# Patient Record
Sex: MALE | Race: WHITE | Employment: FULL TIME | URBAN - METROPOLITAN AREA
[De-identification: names, ages, dates, MRNs, and addresses within clinical notes are randomized per-mention and may not be internally consistent; named-entity substitution may affect disease eponyms.]

---

## 2017-12-21 ENCOUNTER — HOSPITAL ENCOUNTER (OUTPATIENT)
Dept: MRI IMAGING | Age: 42
Discharge: HOME | End: 2017-12-21
Payer: COMMERCIAL

## 2017-12-21 DIAGNOSIS — M23.302 INTERNAL DERANGEMENT OF KNEE INVOLVING LATERAL MENISCUS: ICD-10-CM

## 2017-12-21 PROCEDURE — 73721 MRI JNT OF LWR EXTRE W/O DYE: CPT

## 2019-12-23 LAB — SURGICAL PATHOLOGY REPORT: NORMAL

## 2023-03-20 PROBLEM — G40.909 SEIZURE DISORDER (MULTI): Status: ACTIVE | Noted: 2023-03-20

## 2023-03-20 PROBLEM — R00.2 PALPITATIONS: Status: ACTIVE | Noted: 2023-03-20

## 2023-03-20 PROBLEM — R06.09 EXERTIONAL DYSPNEA: Status: ACTIVE | Noted: 2023-03-20

## 2023-03-20 PROBLEM — H69.91 DYSFUNCTION OF RIGHT EUSTACHIAN TUBE: Status: ACTIVE | Noted: 2023-03-20

## 2023-03-20 PROBLEM — R07.9 CHEST PAIN: Status: ACTIVE | Noted: 2023-03-20

## 2023-03-20 PROBLEM — M25.519 SHOULDER PAIN: Status: ACTIVE | Noted: 2023-03-20

## 2023-03-20 PROBLEM — R07.89 CHEST TIGHTNESS: Status: ACTIVE | Noted: 2023-03-20

## 2023-03-20 PROBLEM — H91.21 SUDDEN HEARING LOSS, RIGHT: Status: ACTIVE | Noted: 2023-03-20

## 2023-03-20 PROBLEM — E29.1 MALE HYPOGONADISM: Status: ACTIVE | Noted: 2023-03-20

## 2023-03-20 PROBLEM — E78.5 HYPERLIPEMIA: Status: ACTIVE | Noted: 2023-03-20

## 2023-03-20 PROBLEM — M25.512 ARTHRALGIA OF LEFT ACROMIOCLAVICULAR JOINT: Status: ACTIVE | Noted: 2023-03-20

## 2023-03-20 PROBLEM — H60.60 CHRONIC OTITIS EXTERNA: Status: ACTIVE | Noted: 2023-03-20

## 2023-03-20 PROBLEM — H90.3 ASYMMETRIC SNHL (SENSORINEURAL HEARING LOSS): Status: ACTIVE | Noted: 2023-03-20

## 2023-03-20 PROBLEM — J30.2 SEASONAL ALLERGIC RHINITIS: Status: ACTIVE | Noted: 2023-03-20

## 2023-03-20 RX ORDER — TESTOSTERONE 50 MG/5G
1 GEL TRANSDERMAL DAILY
COMMUNITY
Start: 2020-03-27 | End: 2024-04-22 | Stop reason: WASHOUT

## 2023-03-20 RX ORDER — PHENYTOIN SODIUM 100 MG/1
400 CAPSULE, EXTENDED RELEASE ORAL NIGHTLY
COMMUNITY
Start: 2022-09-27 | End: 2023-05-22

## 2023-03-20 RX ORDER — MOMETASONE FUROATE 1 MG/G
CREAM TOPICAL 2 TIMES DAILY
COMMUNITY

## 2023-03-20 RX ORDER — ATORVASTATIN CALCIUM 40 MG/1
40 TABLET, FILM COATED ORAL DAILY
COMMUNITY
End: 2023-07-19 | Stop reason: ALTCHOICE

## 2023-03-20 RX ORDER — HYDROCHLOROTHIAZIDE 25 MG/1
1 TABLET ORAL DAILY
COMMUNITY
Start: 2022-01-07 | End: 2023-05-04 | Stop reason: ALTCHOICE

## 2023-03-23 ENCOUNTER — OFFICE VISIT (OUTPATIENT)
Dept: PRIMARY CARE | Facility: CLINIC | Age: 48
End: 2023-03-23
Payer: COMMERCIAL

## 2023-03-23 ENCOUNTER — LAB (OUTPATIENT)
Dept: LAB | Facility: LAB | Age: 48
End: 2023-03-23
Payer: COMMERCIAL

## 2023-03-23 VITALS
DIASTOLIC BLOOD PRESSURE: 82 MMHG | TEMPERATURE: 97.3 F | BODY MASS INDEX: 37.88 KG/M2 | WEIGHT: 264 LBS | SYSTOLIC BLOOD PRESSURE: 116 MMHG

## 2023-03-23 DIAGNOSIS — Z83.3 FAMILY HISTORY OF DIABETES MELLITUS: ICD-10-CM

## 2023-03-23 DIAGNOSIS — E78.49 OTHER HYPERLIPIDEMIA: ICD-10-CM

## 2023-03-23 DIAGNOSIS — G40.909 SEIZURE DISORDER (MULTI): ICD-10-CM

## 2023-03-23 LAB
ALANINE AMINOTRANSFERASE (SGPT) (U/L) IN SER/PLAS: 54 U/L (ref 10–52)
ALBUMIN (G/DL) IN SER/PLAS: 4.8 G/DL (ref 3.4–5)
ALKALINE PHOSPHATASE (U/L) IN SER/PLAS: 91 U/L (ref 33–120)
ANION GAP IN SER/PLAS: 11 MMOL/L (ref 10–20)
ASPARTATE AMINOTRANSFERASE (SGOT) (U/L) IN SER/PLAS: 31 U/L (ref 9–39)
BILIRUBIN TOTAL (MG/DL) IN SER/PLAS: 0.6 MG/DL (ref 0–1.2)
CALCIUM (MG/DL) IN SER/PLAS: 9.8 MG/DL (ref 8.6–10.3)
CARBON DIOXIDE, TOTAL (MMOL/L) IN SER/PLAS: 31 MMOL/L (ref 21–32)
CHLORIDE (MMOL/L) IN SER/PLAS: 102 MMOL/L (ref 98–107)
CHOLESTEROL (MG/DL) IN SER/PLAS: 113 MG/DL (ref 0–199)
CHOLESTEROL IN HDL (MG/DL) IN SER/PLAS: 34.9 MG/DL
CHOLESTEROL/HDL RATIO: 3.2
CREATININE (MG/DL) IN SER/PLAS: 1.15 MG/DL (ref 0.5–1.3)
ESTIMATED AVERAGE GLUCOSE FOR HBA1C: 108 MG/DL
GFR MALE: 79 ML/MIN/1.73M2
GLUCOSE (MG/DL) IN SER/PLAS: 89 MG/DL (ref 74–99)
HEMOGLOBIN A1C/HEMOGLOBIN TOTAL IN BLOOD: 5.4 %
LDL: 36 MG/DL (ref 0–99)
NON HDL CHOLESTEROL: 78 MG/DL
PHENYTOIN (UG/ML) IN SER/PLAS: 3 UG/ML (ref 10–20)
POTASSIUM (MMOL/L) IN SER/PLAS: 4.4 MMOL/L (ref 3.5–5.3)
PROTEIN TOTAL: 7.2 G/DL (ref 6.4–8.2)
SODIUM (MMOL/L) IN SER/PLAS: 140 MMOL/L (ref 136–145)
TRIGLYCERIDE (MG/DL) IN SER/PLAS: 210 MG/DL (ref 0–149)
UREA NITROGEN (MG/DL) IN SER/PLAS: 14 MG/DL (ref 6–23)
VLDL: 42 MG/DL (ref 0–40)

## 2023-03-23 PROCEDURE — 99214 OFFICE O/P EST MOD 30 MIN: CPT | Performed by: FAMILY MEDICINE

## 2023-03-23 PROCEDURE — 36415 COLL VENOUS BLD VENIPUNCTURE: CPT

## 2023-03-23 PROCEDURE — 80061 LIPID PANEL: CPT

## 2023-03-23 PROCEDURE — 80185 ASSAY OF PHENYTOIN TOTAL: CPT

## 2023-03-23 PROCEDURE — 80053 COMPREHEN METABOLIC PANEL: CPT

## 2023-03-23 PROCEDURE — 83036 HEMOGLOBIN GLYCOSYLATED A1C: CPT

## 2023-03-23 PROCEDURE — 3008F BODY MASS INDEX DOCD: CPT | Performed by: FAMILY MEDICINE

## 2023-03-23 RX ORDER — SEMAGLUTIDE 1.34 MG/ML
INJECTION, SOLUTION SUBCUTANEOUS
COMMUNITY
Start: 2023-02-21 | End: 2023-03-23 | Stop reason: ALTCHOICE

## 2023-03-23 RX ORDER — SEMAGLUTIDE 1.34 MG/ML
1 INJECTION, SOLUTION SUBCUTANEOUS
Qty: 3 ML | Refills: 0 | Status: SHIPPED | OUTPATIENT
Start: 2023-03-23 | End: 2023-04-19 | Stop reason: SDUPTHER

## 2023-03-23 ASSESSMENT — ENCOUNTER SYMPTOMS
CARDIOVASCULAR NEGATIVE: 1
RESPIRATORY NEGATIVE: 1
DEPRESSION: 0
CONSTITUTIONAL NEGATIVE: 1
OCCASIONAL FEELINGS OF UNSTEADINESS: 0
LOSS OF SENSATION IN FEET: 0
ENDOCRINE NEGATIVE: 1

## 2023-03-23 ASSESSMENT — PATIENT HEALTH QUESTIONNAIRE - PHQ9
2. FEELING DOWN, DEPRESSED OR HOPELESS: NOT AT ALL
SUM OF ALL RESPONSES TO PHQ9 QUESTIONS 1 AND 2: 0
1. LITTLE INTEREST OR PLEASURE IN DOING THINGS: NOT AT ALL

## 2023-03-23 NOTE — PROGRESS NOTES
Subjective   Patient ID: Jax Monae is a 47 y.o. male who presents for Follow-up (Medication follow up).    Pt presents for follow up:     He has been seen at [facility] in [place], he has started on Ozempic 0.25 mg x 4 weeks, then increased to 0.5 mg sub Q weekly   This is week 6,   Sat is his his second 0.5, he has enough to continue for the 4 weeks   All of his siblings have  DM, one sister and 2 brothers   He has lost about 20 pounds since Feb, 17 pounds since starting Ozempic    Side effects: felt brain fog the first few days   Overall decreased appetite  Decreased portion control      Pt takes Dilantin, last seizure was over 30 years ago  He has not seen neurology since he was a teenager    He is seeing tarun Dahl, testosterone supplementation           Review of Systems   Constitutional: Negative.    Respiratory: Negative.     Cardiovascular: Negative.    Gastrointestinal:         Decreased appetite  Decreased Bms, has noted constipation  No diarrhea    Endocrine: Negative.        Objective   /82   Temp 36.3 °C (97.3 °F)   Wt 120 kg (264 lb)   BMI 37.88 kg/m²     Physical Exam  Constitutional:       Appearance: Normal appearance. He is obese.   Cardiovascular:      Rate and Rhythm: Normal rate and regular rhythm.      Pulses: Normal pulses.      Heart sounds: Normal heart sounds.   Pulmonary:      Effort: Pulmonary effort is normal.      Breath sounds: Normal breath sounds.   Abdominal:      General: Abdomen is flat. Bowel sounds are normal.      Palpations: Abdomen is soft.   Skin:     General: Skin is warm and dry.   Neurological:      General: No focal deficit present.      Mental Status: He is alert and oriented to person, place, and time.   Psychiatric:         Mood and Affect: Mood normal.         Behavior: Behavior normal.         Thought Content: Thought content normal.         Judgment: Judgment normal.         Assessment/Plan   Diagnoses and all orders for this  visit:  BMI 37.0-37.9, adult  -     semaglutide (Ozempic) 1 mg/dose (4 mg/3 mL) pen injector; Inject 0.75 mL (1 mg) under the skin every 7 days.  -     Phenytoin level, total; Future  -     Comprehensive metabolic panel; Future  -     Lipid Panel; Future  -     Hemoglobin A1c; Future  Other hyperlipidemia  Family history of diabetes mellitus  Seizure disorder (CMS/HCC)    Pt has 3 more weeks of the 0.5 dose to complete, as prescribed by another physician.  We also discussed the risk of Ozempic and Dilantin, as absorption may be affected  Follow up appt in 4-6 weeks  Advised pt to call sooner with any questions or concerns

## 2023-03-24 ENCOUNTER — TELEPHONE (OUTPATIENT)
Dept: PRIMARY CARE | Facility: CLINIC | Age: 48
End: 2023-03-24
Payer: COMMERCIAL

## 2023-03-24 NOTE — TELEPHONE ENCOUNTER
Discussed with patient. Patient stated that he is always at 3 since you were at [facility]. Patient stated that he is free to talk anytime today.

## 2023-03-24 NOTE — TELEPHONE ENCOUNTER
I spoke with pt.  Referred to [facility] for Fatty Liver eval.  Recc staying at Ozempic 0.5 mg dose for now.   Reviewed FLP, could consider a decrease in his statin dose, reviewed elevated TG, discussed dietary changes.  Reviewed dilantin level, he reports he has been at 3 for many years and stable.   Follow up weight check appt 5/4  [facility] we re check his A1C in 3-6 months.    Advised him to call with any questions or concerns.

## 2023-04-19 DIAGNOSIS — E78.49 OTHER HYPERLIPIDEMIA: Primary | ICD-10-CM

## 2023-04-19 NOTE — TELEPHONE ENCOUNTER
Pt is requesting if you can send in a 3 month supply?    REFILL  MEDICATION:     Semaglutide (Ozempic) 1MG/dose (4MG/3ML) Pen Injector; Inject 0.75 ML under the skin every 7 days.     PHARM: [facility] [employer]   PHARM NUMBER: [phone]    LR: 3-23-23    LV: 3-23-23  NV: 5-4-23

## 2023-04-21 RX ORDER — SEMAGLUTIDE 1.34 MG/ML
1 INJECTION, SOLUTION SUBCUTANEOUS
OUTPATIENT
Start: 2023-04-21

## 2023-04-24 RX ORDER — SEMAGLUTIDE 1.34 MG/ML
1 INJECTION, SOLUTION SUBCUTANEOUS
Qty: 12 ML | Refills: 0 | Status: SHIPPED | OUTPATIENT
Start: 2023-04-24 | End: 2023-07-19 | Stop reason: SDUPTHER

## 2023-05-04 ENCOUNTER — LAB (OUTPATIENT)
Dept: LAB | Facility: LAB | Age: 48
End: 2023-05-04
Payer: COMMERCIAL

## 2023-05-04 ENCOUNTER — OFFICE VISIT (OUTPATIENT)
Dept: PRIMARY CARE | Facility: CLINIC | Age: 48
End: 2023-05-04
Payer: COMMERCIAL

## 2023-05-04 ENCOUNTER — TELEPHONE (OUTPATIENT)
Dept: PRIMARY CARE | Facility: CLINIC | Age: 48
End: 2023-05-04

## 2023-05-04 VITALS
HEIGHT: 70 IN | DIASTOLIC BLOOD PRESSURE: 78 MMHG | BODY MASS INDEX: 35.5 KG/M2 | SYSTOLIC BLOOD PRESSURE: 118 MMHG | WEIGHT: 248 LBS | TEMPERATURE: 97.1 F

## 2023-05-04 DIAGNOSIS — E66.9 OBESITY (BMI 35.0-39.9 WITHOUT COMORBIDITY): Primary | ICD-10-CM

## 2023-05-04 DIAGNOSIS — K21.9 GASTROESOPHAGEAL REFLUX DISEASE, UNSPECIFIED WHETHER ESOPHAGITIS PRESENT: ICD-10-CM

## 2023-05-04 DIAGNOSIS — E66.9 OBESITY (BMI 35.0-39.9 WITHOUT COMORBIDITY): ICD-10-CM

## 2023-05-04 DIAGNOSIS — K59.00 CONSTIPATION, UNSPECIFIED CONSTIPATION TYPE: ICD-10-CM

## 2023-05-04 LAB
ALANINE AMINOTRANSFERASE (SGPT) (U/L) IN SER/PLAS: 58 U/L (ref 10–52)
ALBUMIN (G/DL) IN SER/PLAS: 4.5 G/DL (ref 3.4–5)
ALKALINE PHOSPHATASE (U/L) IN SER/PLAS: 99 U/L (ref 33–120)
ANION GAP IN SER/PLAS: 13 MMOL/L (ref 10–20)
ASPARTATE AMINOTRANSFERASE (SGOT) (U/L) IN SER/PLAS: 26 U/L (ref 9–39)
BILIRUBIN TOTAL (MG/DL) IN SER/PLAS: 0.6 MG/DL (ref 0–1.2)
CALCIUM (MG/DL) IN SER/PLAS: 9.6 MG/DL (ref 8.6–10.3)
CARBON DIOXIDE, TOTAL (MMOL/L) IN SER/PLAS: 27 MMOL/L (ref 21–32)
CHLORIDE (MMOL/L) IN SER/PLAS: 103 MMOL/L (ref 98–107)
CREATININE (MG/DL) IN SER/PLAS: 1.08 MG/DL (ref 0.5–1.3)
ERYTHROCYTE DISTRIBUTION WIDTH (RATIO) BY AUTOMATED COUNT: 13.7 % (ref 11.5–14.5)
ERYTHROCYTE MEAN CORPUSCULAR HEMOGLOBIN CONCENTRATION (G/DL) BY AUTOMATED: 32.9 G/DL (ref 32–36)
ERYTHROCYTE MEAN CORPUSCULAR VOLUME (FL) BY AUTOMATED COUNT: 86 FL (ref 80–100)
ERYTHROCYTES (10*6/UL) IN BLOOD BY AUTOMATED COUNT: 5.78 X10E12/L (ref 4.5–5.9)
GFR MALE: 85 ML/MIN/1.73M2
GLUCOSE (MG/DL) IN SER/PLAS: 83 MG/DL (ref 74–99)
HEMATOCRIT (%) IN BLOOD BY AUTOMATED COUNT: 49.8 % (ref 41–52)
HEMOGLOBIN (G/DL) IN BLOOD: 16.4 G/DL (ref 13.5–17.5)
LEUKOCYTES (10*3/UL) IN BLOOD BY AUTOMATED COUNT: 5.6 X10E9/L (ref 4.4–11.3)
NRBC (PER 100 WBCS) BY AUTOMATED COUNT: 0 /100 WBC (ref 0–0)
PLATELETS (10*3/UL) IN BLOOD AUTOMATED COUNT: 169 X10E9/L (ref 150–450)
POTASSIUM (MMOL/L) IN SER/PLAS: 4.6 MMOL/L (ref 3.5–5.3)
PROTEIN TOTAL: 7.1 G/DL (ref 6.4–8.2)
SODIUM (MMOL/L) IN SER/PLAS: 138 MMOL/L (ref 136–145)
UREA NITROGEN (MG/DL) IN SER/PLAS: 16 MG/DL (ref 6–23)

## 2023-05-04 PROCEDURE — 80053 COMPREHEN METABOLIC PANEL: CPT

## 2023-05-04 PROCEDURE — 85027 COMPLETE CBC AUTOMATED: CPT

## 2023-05-04 PROCEDURE — 1036F TOBACCO NON-USER: CPT | Performed by: FAMILY MEDICINE

## 2023-05-04 PROCEDURE — 99214 OFFICE O/P EST MOD 30 MIN: CPT | Performed by: FAMILY MEDICINE

## 2023-05-04 PROCEDURE — 3008F BODY MASS INDEX DOCD: CPT | Performed by: FAMILY MEDICINE

## 2023-05-04 PROCEDURE — 36415 COLL VENOUS BLD VENIPUNCTURE: CPT

## 2023-05-04 RX ORDER — PANTOPRAZOLE SODIUM 20 MG/1
20 TABLET, DELAYED RELEASE ORAL
Qty: 30 TABLET | Refills: 5 | Status: SHIPPED | OUTPATIENT
Start: 2023-05-04 | End: 2023-05-30

## 2023-05-04 ASSESSMENT — ENCOUNTER SYMPTOMS
CONSTITUTIONAL NEGATIVE: 1
CARDIOVASCULAR NEGATIVE: 1
RESPIRATORY NEGATIVE: 1

## 2023-05-04 NOTE — PROGRESS NOTES
Subjective   Patient ID: Crow Monae is a 47 y.o. male who presents for No chief complaint on file..    Pt presents for follow up    He is working out 5 nights per week  He is watching his calories, 1300 / day   Decreased appetite  Constipation, BMS every other day     He feels well     He stopped hydrochlorothiazide due to a rapid HR     He has a history of GERD and stomach ulcers  He notes symptoms w/ certain foods and any alcohol  He is asking for a new PPI RX         Review of Systems   Constitutional: Negative.    Respiratory: Negative.     Cardiovascular: Negative.        Objective   There were no vitals taken for this visit.    Physical Exam  Constitutional:       Appearance: Normal appearance. He is obese.   Cardiovascular:      Rate and Rhythm: Normal rate and regular rhythm.      Pulses: Normal pulses.      Heart sounds: Normal heart sounds.   Pulmonary:      Effort: Pulmonary effort is normal.      Breath sounds: Normal breath sounds.   Abdominal:      General: Abdomen is flat. Bowel sounds are normal.      Palpations: Abdomen is soft.   Neurological:      Mental Status: He is alert.   Psychiatric:         Mood and Affect: Mood normal.         Behavior: Behavior normal.         Thought Content: Thought content normal.         Judgment: Judgment normal.         Assessment/Plan   Diagnoses and all orders for this visit:  Obesity (BMI 35.0-39.9 without comorbidity)  -     Comprehensive metabolic panel; Future  -     CBC; Future  Gastroesophageal reflux disease, unspecified whether esophagitis present  -     pantoprazole (Protonix) 20 mg EC tablet; Take 1 tablet (20 mg) by mouth once daily in the morning. Take before meals. Do not crush, chew, or split.  -     CBC; Future  Constipation, unspecified constipation type  Dietary fiber hand out provided    Continue with Ozempic 1mg  Follow up appt in 8 weeks  Advised pt to call sooner with any questions or concerns     Alice Stubbs,

## 2023-05-04 NOTE — TELEPHONE ENCOUNTER
Please notify pt his labs are close to normal, his one liver function test is still elevated.  We can continue to monitor.       Thank you,  Alice Stubbs, DO

## 2023-05-20 DIAGNOSIS — G40.909 SEIZURE DISORDER (MULTI): Primary | ICD-10-CM

## 2023-05-22 RX ORDER — PHENYTOIN SODIUM 100 MG/1
CAPSULE, EXTENDED RELEASE ORAL
Qty: 360 CAPSULE | Refills: 1 | Status: SHIPPED | OUTPATIENT
Start: 2023-05-22 | End: 2024-05-15 | Stop reason: SDUPTHER

## 2023-05-27 DIAGNOSIS — K21.9 GASTROESOPHAGEAL REFLUX DISEASE, UNSPECIFIED WHETHER ESOPHAGITIS PRESENT: ICD-10-CM

## 2023-05-30 RX ORDER — PANTOPRAZOLE SODIUM 20 MG/1
20 TABLET, DELAYED RELEASE ORAL
Qty: 30 TABLET | Refills: 5 | Status: SHIPPED | OUTPATIENT
Start: 2023-05-30 | End: 2023-10-12 | Stop reason: ALTCHOICE

## 2023-06-12 ENCOUNTER — APPOINTMENT (OUTPATIENT)
Dept: PRIMARY CARE | Facility: CLINIC | Age: 48
End: 2023-06-12
Payer: COMMERCIAL

## 2023-07-19 ENCOUNTER — OFFICE VISIT (OUTPATIENT)
Dept: PRIMARY CARE | Facility: CLINIC | Age: 48
End: 2023-07-19
Payer: COMMERCIAL

## 2023-07-19 VITALS — BODY MASS INDEX: 32.14 KG/M2 | SYSTOLIC BLOOD PRESSURE: 120 MMHG | DIASTOLIC BLOOD PRESSURE: 80 MMHG | WEIGHT: 224 LBS

## 2023-07-19 DIAGNOSIS — E78.49 OTHER HYPERLIPIDEMIA: ICD-10-CM

## 2023-07-19 PROCEDURE — 99213 OFFICE O/P EST LOW 20 MIN: CPT | Performed by: FAMILY MEDICINE

## 2023-07-19 PROCEDURE — 1036F TOBACCO NON-USER: CPT | Performed by: FAMILY MEDICINE

## 2023-07-19 PROCEDURE — 3008F BODY MASS INDEX DOCD: CPT | Performed by: FAMILY MEDICINE

## 2023-07-19 RX ORDER — SEMAGLUTIDE 1.34 MG/ML
1 INJECTION, SOLUTION SUBCUTANEOUS
Qty: 12 ML | Refills: 0 | Status: SHIPPED | OUTPATIENT
Start: 2023-07-19 | End: 2023-10-11 | Stop reason: ALTCHOICE

## 2023-07-19 ASSESSMENT — ENCOUNTER SYMPTOMS
CARDIOVASCULAR NEGATIVE: 1
GASTROINTESTINAL NEGATIVE: 1
CONSTITUTIONAL NEGATIVE: 1
RESPIRATORY NEGATIVE: 1

## 2023-07-19 ASSESSMENT — PATIENT HEALTH QUESTIONNAIRE - PHQ9
SUM OF ALL RESPONSES TO PHQ9 QUESTIONS 1 AND 2: 0
2. FEELING DOWN, DEPRESSED OR HOPELESS: NOT AT ALL
1. LITTLE INTEREST OR PLEASURE IN DOING THINGS: NOT AT ALL

## 2023-07-19 NOTE — PROGRESS NOTES
Subjective   Patient ID: Crow Monae is a 47 y.o. male who presents for Follow-up (Medication refill ).    Pt presents for weight loss follow up:     Bms are every few days, he feels they are regular   He has been in the 220s for the last month  Exercise: cardio, elliptical, limited weights, walking, uses treadmill at work   Eating healthier  Takes away food / drink cravings,gautam when stressed       Clothes from a 44-34  He has stopped his Lipitor, about 3 weeks ago          Review of Systems   Constitutional: Negative.    Respiratory: Negative.     Cardiovascular: Negative.    Gastrointestinal: Negative.        Objective   /80   Wt 102 kg (224 lb)   BMI 32.14 kg/m²     Physical Exam  Vitals and nursing note reviewed.   Constitutional:       Appearance: Normal appearance.   Cardiovascular:      Rate and Rhythm: Normal rate and regular rhythm.      Heart sounds: Normal heart sounds.   Pulmonary:      Effort: Pulmonary effort is normal.      Breath sounds: Normal breath sounds.   Neurological:      Mental Status: He is alert.         Assessment/Plan   Diagnoses and all orders for this visit:  BMI 37.0-37.9, adult  -     semaglutide (Ozempic) 1 mg/dose (4 mg/3 mL) pen injector; Inject 1 mg under the skin every 7 days.  -     Lipid Panel; Future  Other hyperlipidemia  -     semaglutide (Ozempic) 1 mg/dose (4 mg/3 mL) pen injector; Inject 1 mg under the skin every 7 days.  -     Lipid Panel; Future    Re check lipid panel in 2 months  Follow up in office in 2-3 months  Advised pt to call sooner with weight plateau, as we discussed a possible dose increase  Encouraged his continued healthy diet and regular exercise regimen    Alice Stubbs,

## 2023-09-13 LAB
CHOLESTEROL (MG/DL) IN SER/PLAS: 190 MG/DL (ref 0–199)
CHOLESTEROL IN HDL (MG/DL) IN SER/PLAS: 37 MG/DL
CHOLESTEROL/HDL RATIO: 5.1
ERYTHROCYTE DISTRIBUTION WIDTH (RATIO) BY AUTOMATED COUNT: 13.1 % (ref 11.5–14.5)
ERYTHROCYTE MEAN CORPUSCULAR HEMOGLOBIN CONCENTRATION (G/DL) BY AUTOMATED: 33.1 G/DL (ref 32–36)
ERYTHROCYTE MEAN CORPUSCULAR VOLUME (FL) BY AUTOMATED COUNT: 87 FL (ref 80–100)
ERYTHROCYTES (10*6/UL) IN BLOOD BY AUTOMATED COUNT: 5.37 X10E12/L (ref 4.5–5.9)
HEMATOCRIT (%) IN BLOOD BY AUTOMATED COUNT: 46.8 % (ref 41–52)
HEMOGLOBIN (G/DL) IN BLOOD: 15.5 G/DL (ref 13.5–17.5)
LDL: 80 MG/DL (ref 0–99)
LEUKOCYTES (10*3/UL) IN BLOOD BY AUTOMATED COUNT: 5.5 X10E9/L (ref 4.4–11.3)
NON HDL CHOLESTEROL: 153 MG/DL
PLATELETS (10*3/UL) IN BLOOD AUTOMATED COUNT: 177 X10E9/L (ref 150–450)
PROSTATE SPECIFIC AG (NG/ML) IN SER/PLAS: 0.48 NG/ML (ref 0–4)
TESTOSTERONE (NG/DL) IN SER/PLAS: 825 NG/DL (ref 240–1000)
TRIGLYCERIDE (MG/DL) IN SER/PLAS: 364 MG/DL (ref 0–149)
VLDL: 73 MG/DL (ref 0–40)

## 2023-09-27 ENCOUNTER — HOSPITAL ENCOUNTER (OUTPATIENT)
Dept: DATA CONVERSION | Facility: HOSPITAL | Age: 48
End: 2023-09-27
Attending: ORTHOPAEDIC SURGERY | Admitting: ORTHOPAEDIC SURGERY
Payer: COMMERCIAL

## 2023-09-27 DIAGNOSIS — M19.012 PRIMARY OSTEOARTHRITIS, LEFT SHOULDER: ICD-10-CM

## 2023-09-29 VITALS — HEIGHT: 70 IN | BODY MASS INDEX: 30.58 KG/M2 | WEIGHT: 213.63 LBS

## 2023-10-01 NOTE — OP NOTE
Post Operative Note:     Post-Procedure Diagnosis: Left shoulder distal/AC  arthritis   Procedure: 1.  Left shoulder diagnostic arthroscopy  with limited debridement glenohumeral joint/arthroscopic subacromial decompression  2.  Left shoulder open distal clavicle excision  3.   4.   5.   Surgeon: Pj Morgan MD   Resident/Fellow/Other Assistant: Pj Garcia PA-C   Estimated Blood Loss (mL): 50   Specimen: no   Findings: Partial cuff tearing, labral tearing, AC  arthritis     Operative Report Dictated:  Dictation: not applicable - note contains Operative  Report   Note Recipients: Alice Stubbs MD   Operative Report:    Indications: 47-year-old male with persistent left shoulder pain despite conservative treatment elected proceed surgery for left shoulder arthroscopic/open distal  clavicle excision    Risks benefits and alternatives to surgery were discussed including but not limited to Infection, bleeding, neurovascular injury, pain and dysfunction, hardware related complications including cutout failure breakage, loss of function, motion, and permanent  disability as well as the cardiovascular and pulmonary complications from anesthesia including death and DVT. Patient and family accept these risks.  We discussed specificallyIncomplete pain relief, stiffness, recurrent arthritis, intraoperative decision regarding labral or rotator cuff pathology, instability, need for future revision surgeries    Patient identified in the preop area.  Left upper extremity marked and confirmed with patient as the operative site.  Brought back to the operating room and anesthetized under general anesthesia.  Left upper extremity was then prepped and draped in standard sterile fashion.  Patient, site and procedure were confirmed with timeout.  Everyone in the room agreed.  Preop antibiotics were given.  Patient was given a preoperative scalene nerve block.  Placed into a beachchair position with bony prominences  well-padded    We then made standard glenohumeral working portals beginning posteriorly.  Then created anterior mid glenoid portal.  Cannula was placed.  Glenohumeral findings: Patient had a small amount of labral tearing and most notable stable type II SLAP tear.  We debrided the superior labrum as well as some frayed edge in the anterior and posterior aspects.  Cartilage was excellent without interval  loss.  Patient did have a little bit of partial-thickness supraspinatus the tear did minimal debridement of the loose flap it was less than 20% of the full-thickness.  Biceps was intact and normal did not sublux into the biceps sling or subscapularis    We then reintroduced the scope and into the subacromial space.  Subacromial findings: Moderate subacromial adhesions.  Performed subacromial decompression resecting about 5 mm bleeding bone of the acromion coplaning solely down to the distal clavicle.  His rotator cuff was intact on bursal sided viewing without any  billow or tear.  We identified his AC joint and distal clavicle.  We did a partial debridement with arthroscopic assistance to identify the AC joint.  He had some notable cystic change a lot of synovitic change within the AC joint.  Given extensive  amount of synovitis we then proceeded with open distal clavicle excision.  We extended our anterior portal to the distal clavicle.  Identified is a distal clavicle/AC joint.  We elevated the ligaments bluntly off of the bone.  We then resected approximately  a 6 mm area of the bone with a oscillating saw.  This gave us an overall gap of approximately 1 cm between the distal clavicle and the acromion.  We were comfortably able to get circumferentially around the distal clavicle the bone piece was removed it  was a smooth without rough edges.  The distal clavicle was a stable on stability of testing.  I then oversewed the capsule and ligaments with interrupted #1 Vicryl.  2-0 Vicryl subcutaneous and Monocryl for  closure.  Sterile dressing applied.  Patient  woken from anesthesia in the PACU stable condition.  Sling applied.    Pj Garcia PA-C was present throughout the entire case. Given the nature of the disease process and the procedure, a skilled surgical first assistant was necessary during the case. The assistant was necessary to hold retractors and to manipulate  the extremity during the procedure. A certified scrub tech was at the back table managing the instruments and supplies for the surgical case.       Electronic Signatures:  Pj Morgan)  (Signed 27-Sep-2023 12:12)   Authored: Post Operative Note, Note Completion      Last Updated: 27-Sep-2023 12:12 by Pj Morgan)

## 2023-10-11 ENCOUNTER — PATIENT MESSAGE (OUTPATIENT)
Dept: PRIMARY CARE | Facility: CLINIC | Age: 48
End: 2023-10-11

## 2023-10-11 ENCOUNTER — OFFICE VISIT (OUTPATIENT)
Dept: PRIMARY CARE | Facility: CLINIC | Age: 48
End: 2023-10-11
Payer: COMMERCIAL

## 2023-10-11 VITALS — DIASTOLIC BLOOD PRESSURE: 80 MMHG | SYSTOLIC BLOOD PRESSURE: 122 MMHG | BODY MASS INDEX: 30.56 KG/M2 | WEIGHT: 213 LBS

## 2023-10-11 DIAGNOSIS — E66.9 OBESITY (BMI 35.0-39.9 WITHOUT COMORBIDITY): Primary | ICD-10-CM

## 2023-10-11 DIAGNOSIS — Z23 NEED FOR VACCINATION: ICD-10-CM

## 2023-10-11 DIAGNOSIS — E66.9 OBESITY (BMI 35.0-39.9 WITHOUT COMORBIDITY): ICD-10-CM

## 2023-10-11 DIAGNOSIS — G40.909 SEIZURE DISORDER (MULTI): ICD-10-CM

## 2023-10-11 PROBLEM — S80.00XA CONTUSION OF KNEE: Status: ACTIVE | Noted: 2019-03-25

## 2023-10-11 PROBLEM — S43.421A SPRAIN OF RIGHT ROTATOR CUFF CAPSULE: Status: ACTIVE | Noted: 2019-03-25

## 2023-10-11 PROCEDURE — 3008F BODY MASS INDEX DOCD: CPT | Performed by: FAMILY MEDICINE

## 2023-10-11 PROCEDURE — 99213 OFFICE O/P EST LOW 20 MIN: CPT | Performed by: FAMILY MEDICINE

## 2023-10-11 PROCEDURE — 90686 IIV4 VACC NO PRSV 0.5 ML IM: CPT | Performed by: FAMILY MEDICINE

## 2023-10-11 PROCEDURE — 1036F TOBACCO NON-USER: CPT | Performed by: FAMILY MEDICINE

## 2023-10-11 PROCEDURE — 90471 IMMUNIZATION ADMIN: CPT | Performed by: FAMILY MEDICINE

## 2023-10-11 RX ORDER — OXYCODONE AND ACETAMINOPHEN 5; 325 MG/1; MG/1
1 TABLET ORAL EVERY 6 HOURS PRN
COMMUNITY
Start: 2023-09-25 | End: 2024-05-15 | Stop reason: WASHOUT

## 2023-10-11 ASSESSMENT — PATIENT HEALTH QUESTIONNAIRE - PHQ9
1. LITTLE INTEREST OR PLEASURE IN DOING THINGS: NOT AT ALL
2. FEELING DOWN, DEPRESSED OR HOPELESS: NOT AT ALL
SUM OF ALL RESPONSES TO PHQ9 QUESTIONS 1 AND 2: 0

## 2023-10-11 ASSESSMENT — ENCOUNTER SYMPTOMS
CARDIOVASCULAR NEGATIVE: 1
RESPIRATORY NEGATIVE: 1

## 2023-10-11 NOTE — PROGRESS NOTES
Subjective   Patient ID: Crow Monae is a 47 y.o. male who presents for Weight Check.    Pt presents for follow up for weight loss  Doing well on Ozempic, no side effects at this time    Recent shoulder surgery  So has been exercising less  Reviewed lipid panel, TG are elevated  Pt reports minimal carb intake  No dietary fish consumption           Review of Systems   Respiratory: Negative.     Cardiovascular: Negative.        Objective   /80   Wt 96.6 kg (213 lb)   BMI 30.56 kg/m²     Physical Exam  Vitals reviewed.   Constitutional:       Appearance: Normal appearance.   Cardiovascular:      Rate and Rhythm: Normal rate and regular rhythm.      Heart sounds: Normal heart sounds.   Pulmonary:      Effort: Pulmonary effort is normal.      Breath sounds: Normal breath sounds.   Abdominal:      General: Bowel sounds are normal.      Palpations: Abdomen is soft.   Neurological:      General: No focal deficit present.      Mental Status: He is alert.   Psychiatric:         Mood and Affect: Mood normal.         Assessment/Plan   Diagnoses and all orders for this visit:  Obesity (BMI 35.0-39.9 without comorbidity)  -     Lipid Panel; Future  -     semaglutide 2 mg/dose (8 mg/3 mL) pen injector; Inject 2 mg under the skin 1 (one) time per week.  Need for vaccination  -     Flu vaccine (IIV4) age 6 months and greater, preservative free  Seizure disorder (CMS/HCC)  stable    Pt wishes to increase Ozempic dose, as his weight loss has plateaued  Pt to increase to 1.5 for the next 3-4 weeks to see how he tolerates it and if tolerating well, will increase to the 2 mg dose   Follow up appt in 3 months, pt to have lipids checked prior   Advised pt to call sooner with any questions or concerns  Alice Stubbs,       Last Dilantin level checked 3.23, stable/ low where pt has been

## 2023-10-12 ENCOUNTER — CLINICAL SUPPORT (OUTPATIENT)
Dept: ORTHOPEDIC SURGERY | Facility: CLINIC | Age: 48
End: 2023-10-12
Payer: COMMERCIAL

## 2023-10-12 ENCOUNTER — OFFICE VISIT (OUTPATIENT)
Dept: ORTHOPEDIC SURGERY | Facility: CLINIC | Age: 48
End: 2023-10-12
Payer: COMMERCIAL

## 2023-10-12 DIAGNOSIS — M75.102 TEAR OF LEFT ROTATOR CUFF, UNSPECIFIED TEAR EXTENT, UNSPECIFIED WHETHER TRAUMATIC: Primary | ICD-10-CM

## 2023-10-12 DIAGNOSIS — M75.102 TEAR OF LEFT ROTATOR CUFF, UNSPECIFIED TEAR EXTENT, UNSPECIFIED WHETHER TRAUMATIC: ICD-10-CM

## 2023-10-12 PROCEDURE — 1036F TOBACCO NON-USER: CPT | Performed by: ORTHOPAEDIC SURGERY

## 2023-10-12 PROCEDURE — 99024 POSTOP FOLLOW-UP VISIT: CPT | Performed by: ORTHOPAEDIC SURGERY

## 2023-10-12 PROCEDURE — 73030 X-RAY EXAM OF SHOULDER: CPT | Mod: LEFT SIDE | Performed by: ORTHOPAEDIC SURGERY

## 2023-10-12 PROCEDURE — 3008F BODY MASS INDEX DOCD: CPT | Performed by: ORTHOPAEDIC SURGERY

## 2023-10-12 RX ORDER — IBUPROFEN 800 MG/1
800 TABLET ORAL 3 TIMES DAILY
Qty: 90 TABLET | Refills: 0 | Status: SHIPPED | OUTPATIENT
Start: 2023-10-12 | End: 2023-11-11

## 2023-10-12 NOTE — PROGRESS NOTES
History of Present Illness  Patient returns today after shoulder arthroscopy.  Pain is improving.   Denies any numbness tingling or shortness of breath. Pain is appropriate for post-op course.     Exam  Mild swelling  Incisions healthy, no drainage or erythema  Tolerates gentle passive forward flexion  Neurovascular exam normal distally     Assessment  Patient status post left shoulder arthroscopy distal clavicle excision     Plan  Surgical details discussed.  Encouraged non-opioid pain medications.  Ibuprofen 800 mg  Discussed sling wear and activity restrictions.  Discussed physical therapy protocol.  Follow-up ~ 1 month.

## 2023-11-03 ENCOUNTER — PATIENT MESSAGE (OUTPATIENT)
Dept: PRIMARY CARE | Facility: CLINIC | Age: 48
End: 2023-11-03
Payer: COMMERCIAL

## 2023-11-03 DIAGNOSIS — E66.9 OBESITY (BMI 35.0-39.9 WITHOUT COMORBIDITY): ICD-10-CM

## 2023-11-09 ENCOUNTER — OFFICE VISIT (OUTPATIENT)
Dept: ORTHOPEDIC SURGERY | Facility: CLINIC | Age: 48
End: 2023-11-09
Payer: COMMERCIAL

## 2023-11-09 DIAGNOSIS — M25.512 ACUTE PAIN OF LEFT SHOULDER: Primary | ICD-10-CM

## 2023-11-09 PROCEDURE — 1036F TOBACCO NON-USER: CPT | Performed by: ORTHOPAEDIC SURGERY

## 2023-11-09 PROCEDURE — 3008F BODY MASS INDEX DOCD: CPT | Performed by: ORTHOPAEDIC SURGERY

## 2023-11-09 PROCEDURE — 99024 POSTOP FOLLOW-UP VISIT: CPT | Performed by: ORTHOPAEDIC SURGERY

## 2023-11-09 NOTE — PROGRESS NOTES
History of Present Illness  Patient returns today after shoulder arthroscopy.  Pain is improving.   Denies any numbness tingling or shortness of breath. Pain is appropriate for post-op course.     Exam  Mild swelling  Incisions healthy, no drainage or erythema  Tolerates gentle passive forward flexion  Neurovascular exam normal distally     Assessment  Patient status post left shoulder arthroscopy distal clavicle excision     Plan  Surgical details discussed.  Encouraged non-opioid pain medications.  Ibuprofen 800 mg  Physical therapy at [facility], 5 pounds max lifting  Follow-up in 1 month for reevaluation 2 view shoulder at that time

## 2023-12-12 NOTE — PROGRESS NOTES
"Physical Therapy    Physical Therapy Evaluation and Treatment      Patient Name: Jax Monae \"Crow\"  MRN: [id number]  Today's Date: 12/13/2023    Insurance: [employer]  Allowed visits: 40  Visit number: 1    Subjective  HPI: Left shoulder limited debridement/SAD/distal clavicle excision 9/27/23. He notes \"it's not gone so well\" as he does have continued pain in anterior shoulder and into his arm. He denies any new FORTINO. He states \"I have little strength\" as he has difficulty pulling a blanket up over himself. He is right hand dominant. Physical therapy has not been recommended by surgeon until this time. Patient was in a sling for approximately 3 days. Chief complaint currently is \"the constant pain through my bicep area\". He denies paresthesias into his left UE. Biggest limitation is \"anything with my left arm\". He has difficulty donning a jacket. Exacerbating factors include carrying, pushing, lifting, and weight bearing through left UE. Relieving factors include resting in a slinged position. He has difficulty sleeping as he has to position his arm on a pillow. He takes ibuprofen \"like candy\". He is also taking Tylenol. He had pain localized to his shoulder prior to surgery; pain into his arm has been present since surgery. He has less confidence in his ability to perform his job due to shoulder pain/weakness. PMH is positive for 100 lb weight loss over the last 10 months by using Ozempic.   Referring physician: Pj Morgan MD - F/U next week.   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      Patient-specific goal: \"to feel like I have normal strength back in this arm and get rid of this pain\".     Objective  Worst pain in the last 24 hours, 8-9/10    Precautions: no weight > 5 lbs    Observation: FHP, rounded shoulders, thoracic kyphosis     AROM  Left shoulder flexion: 159 degrees P!   Left shoulder abduction: 147 " degrees P!   Left shoulder ER @ 90 degrees abd: 76 degrees   Left shoulder IR @ 90 degrees abd: 48 degrees  Right HBB: L2     Left HBB: L3    MMT  Deltoid flexion right: 4+    Deltoid flexion left: 4 p!   Deltoid abduction right: 5   Deltoid abduction left: 4+ p!   ER @ 0 degrees abduction right: 4+   ER @ 0 degrees abduction left: 4+  IR @ 0 degrees abduction right: 5    IR @ 0 degrees abduction left: 5   ER @ 90 degrees abduction left: 5  IR @ 90 degrees abduction left: 5  Rhomboids right: 4    Rhomboids left: 4-   Middle trapezius right: 4    Middle trapezius left: 3+ P!  Lower trapezius right: 4-    Lower trapezius left: 3+ P!     Palpation: slight winging of inferior angle of left scapula compared to right in [place] position    Special Tests: Neer's positive   Hawkin's Aman positive   Spurling's negative  Spurling's A negative  Quadrant negative   Yergason's positive   Speed's negative    Quick DASH: 39%    Assessment  47 yo male with approximately 10 week history of present condition and presence of 2 personal factors and/or comorbidities that impact the plan of care including chronicity of symptoms and PMH of recent significant weight loss presents with left shoulder pain, decreased AROM, decreased strength, and decreased tolerance to reaching, lifting, and stabilizing with left UE consistent with impingement syndrome and possible biceps tendinitis effecting the following body structures and functions: left UE body region and musculoskeletal body system including the left shoulder. Activity limitations and participation restrictions include decreased tolerance to use of left UE. Crow will therefore benefit from PT management to establish a HEP and promote periscapular strengthening. The clinical presentation of this patient is evolving and their history and examination findings are consistent with a moderate complexity evaluation. Good potential.    Treatment provided today: Initial evaluation  "completed. Discussed objective findings and goals of skilled care. Instructed patient in therapeutic exercise and HEP with handout outlining specific parameters provided (prone rhomboids 6\"2x10, supine mid-trap GTB 2x10, ER isometrics 5\"x10, SA wall slides x10). Agreed upon POC and answered all questions.    90966 - 22 minutes, 1 unit untimed  21784 - 23 minutes, 2 units    Plan  2x/week for 3-4 weeks 6-8 visits. Lina Crespo     Goals  Independent with HEP to expedite progress and promote goal achievement.   Decrease DASH to < or equal to 20% disabled for increased functional ability.  Increase AROM left shoulder abduction to > or equal to 155 degrees for ease with reaching OH and donning coat.   Increase strength left deltoid flexion to > or equal to 4+/5 without pain; left rhomboids and trapezius to > or equal to 4/5 for improved stability required for lifting, carrying, pushing, and weight bearing through left UE.   Decrease pain at worst to < or equal to 2/10 for improved QOL and confidence in ability to perform work tasks.       "

## 2023-12-13 ENCOUNTER — EVALUATION (OUTPATIENT)
Dept: PHYSICAL THERAPY | Facility: CLINIC | Age: 48
End: 2023-12-13
Payer: COMMERCIAL

## 2023-12-13 DIAGNOSIS — M25.312 DYSKINESIS OF LEFT SCAPULA: Primary | ICD-10-CM

## 2023-12-13 DIAGNOSIS — M25.512 ACUTE PAIN OF LEFT SHOULDER: ICD-10-CM

## 2023-12-13 PROCEDURE — 97110 THERAPEUTIC EXERCISES: CPT | Mod: GP | Performed by: PHYSICAL THERAPIST

## 2023-12-13 PROCEDURE — 97162 PT EVAL MOD COMPLEX 30 MIN: CPT | Mod: GP | Performed by: PHYSICAL THERAPIST

## 2023-12-13 ASSESSMENT — PAIN SCALES - GENERAL: PAINLEVEL_OUTOF10: 2

## 2023-12-13 ASSESSMENT — PAIN DESCRIPTION - DESCRIPTORS: DESCRIPTORS: THROBBING

## 2024-01-27 ENCOUNTER — TELEMEDICINE (OUTPATIENT)
Dept: PRIMARY CARE | Facility: CLINIC | Age: 49
End: 2024-01-27
Payer: COMMERCIAL

## 2024-01-27 DIAGNOSIS — J01.40 ACUTE NON-RECURRENT PANSINUSITIS: Primary | ICD-10-CM

## 2024-01-27 PROCEDURE — 99213 OFFICE O/P EST LOW 20 MIN: CPT | Performed by: NURSE PRACTITIONER

## 2024-01-27 ASSESSMENT — ENCOUNTER SYMPTOMS
COUGH: 0
ACTIVITY CHANGE: 0
LIGHT-HEADEDNESS: 0
HEADACHES: 1
CHILLS: 0
MYALGIAS: 0
SINUS PRESSURE: 1
DIAPHORESIS: 0
SINUS PAIN: 1
CHEST TIGHTNESS: 0
SHORTNESS OF BREATH: 0
APPETITE CHANGE: 0
NAUSEA: 0
DIZZINESS: 0
SORE THROAT: 1
FEVER: 0
VOMITING: 0

## 2024-01-27 ASSESSMENT — LIFESTYLE VARIABLES: HISTORY_OF_SMOKING: I HAVE NEVER SMOKED

## 2024-01-27 NOTE — PROGRESS NOTES
Subjective   Patient ID: Crow Monae is a 48 y.o. male who presents for sinus issue.    Sinus issue started before Xmas  Had COVID  Has been persistent  Sx have improve for a day or 2 then returns  Sore throat, pain to face, pressure under eyes, sinus pressure purulent drainage, HA  Occasionally blood tinged mucous from nose  OTC treatment tried: mucinex made feel worse  Tried zyrtec for a few days no help  Taking ibuprofen and tylenol most helpful  Denies fever, cough  Had fever initially with covid             Review of Systems   Constitutional:  Negative for activity change, appetite change, chills, diaphoresis and fever.   HENT:  Positive for congestion, postnasal drip, sinus pressure, sinus pain and sore throat.    Respiratory:  Negative for cough, chest tightness and shortness of breath.    Cardiovascular:  Negative for chest pain.   Gastrointestinal:  Negative for nausea and vomiting.   Musculoskeletal:  Negative for myalgias.   Neurological:  Positive for headaches. Negative for dizziness and light-headedness.       Objective   There were no vitals taken for this visit.    Physical Exam  Constitutional:       General: He is not in acute distress.     Appearance: Normal appearance. He is ill-appearing.      Comments: Pt location in [place] and consent obtained.   Telemedicine appropriate evaluation completed.  Unable to perform complete physical exam due to virtual visit, patient was visualized on interactive video.      Neurological:      Mental Status: He is alert.         Assessment/Plan   Diagnoses and all orders for this visit:  Acute non-recurrent pansinusitis  Prescribed Augmentin 875 mg  Take 1 tablet twice daily for 10 days  Disc 20  No refill  Phoned in to [facility] # [id number]  Recommend OTC Flonase nasal spray 1 spray each nostril daily for 1-2 weeks or as needed  Saline nasal spray as needed, increase fluids, steam showers to promote sinus drainage, and Ibuprofen and/or  Tylenol as needed for sinus  pain.  Follow up with PCP if symptoms persist or worsen  Complete entire coarse of antibiotic

## 2024-03-29 DIAGNOSIS — E29.1 MALE HYPOGONADISM: Primary | ICD-10-CM

## 2024-04-02 DIAGNOSIS — E66.9 OBESITY (BMI 35.0-39.9 WITHOUT COMORBIDITY): ICD-10-CM

## 2024-04-03 ENCOUNTER — TELEPHONE (OUTPATIENT)
Dept: PRIMARY CARE | Facility: CLINIC | Age: 49
End: 2024-04-03
Payer: COMMERCIAL

## 2024-04-03 DIAGNOSIS — E66.9 OBESITY (BMI 35.0-39.9 WITHOUT COMORBIDITY): ICD-10-CM

## 2024-04-03 RX ORDER — SEMAGLUTIDE 2.68 MG/ML
INJECTION, SOLUTION SUBCUTANEOUS
Qty: 9 ML | Refills: 1 | OUTPATIENT
Start: 2024-04-03

## 2024-04-03 NOTE — TELEPHONE ENCOUNTER
The earliest appt available was 5/15/24. Pt stated that he is getting blood work done this week for his appt with Dr. Calvin next week on the 4/22/24.    REFILL  MEDICATION:     Semaglutide 2 MG/dose (8 MG/3 ML) Pen Injector; Inject 2 MG under the skin 1 time per week.    PHARM: [facility]     LR: 11/3/23     9 ML with 1 refill  LV: 10/11/23  NV: 5/15/24

## 2024-04-09 ENCOUNTER — LAB (OUTPATIENT)
Dept: LAB | Facility: LAB | Age: 49
End: 2024-04-09
Payer: COMMERCIAL

## 2024-04-09 DIAGNOSIS — E29.1 MALE HYPOGONADISM: ICD-10-CM

## 2024-04-09 DIAGNOSIS — E66.9 OBESITY (BMI 35.0-39.9 WITHOUT COMORBIDITY): ICD-10-CM

## 2024-04-09 LAB
CHOLEST SERPL-MCNC: 214 MG/DL (ref 0–199)
CHOLESTEROL/HDL RATIO: 5
ERYTHROCYTE [DISTWIDTH] IN BLOOD BY AUTOMATED COUNT: 13 % (ref 11.5–14.5)
HCT VFR BLD AUTO: 48.5 % (ref 41–52)
HDLC SERPL-MCNC: 43 MG/DL
HGB BLD-MCNC: 16.2 G/DL (ref 13.5–17.5)
LDLC SERPL CALC-MCNC: 99 MG/DL
MCH RBC QN AUTO: 28.7 PG (ref 26–34)
MCHC RBC AUTO-ENTMCNC: 33.4 G/DL (ref 32–36)
MCV RBC AUTO: 86 FL (ref 80–100)
NON HDL CHOLESTEROL: 171 MG/DL (ref 0–149)
NRBC BLD-RTO: 0 /100 WBCS (ref 0–0)
PLATELET # BLD AUTO: 170 X10*3/UL (ref 150–450)
PSA SERPL-MCNC: 0.53 NG/ML
RBC # BLD AUTO: 5.64 X10*6/UL (ref 4.5–5.9)
TESTOST SERPL-MCNC: 1000 NG/DL (ref 240–1000)
TRIGL SERPL-MCNC: 360 MG/DL (ref 0–149)
VLDL: 72 MG/DL (ref 0–40)
WBC # BLD AUTO: 5.1 X10*3/UL (ref 4.4–11.3)

## 2024-04-10 ENCOUNTER — TELEPHONE (OUTPATIENT)
Dept: PRIMARY CARE | Facility: CLINIC | Age: 49
End: 2024-04-10
Payer: COMMERCIAL

## 2024-04-11 NOTE — TELEPHONE ENCOUNTER
Pt said that nothing has really changed in the diet.  He did read that testosterone can increase trig in which he is on .  Would you like him to go on anything?

## 2024-04-11 NOTE — TELEPHONE ENCOUNTER
----- Message from Alice Stubbs, DO sent at 4/10/2024  7:54 AM EDT -----  Please notify pt that his triglycerides have increased, has he made any recent dietary changes that could be the reason?  Thank you,  Alice Stubbs, DO

## 2024-04-22 ENCOUNTER — OFFICE VISIT (OUTPATIENT)
Dept: ENDOCRINOLOGY | Facility: CLINIC | Age: 49
End: 2024-04-22
Payer: COMMERCIAL

## 2024-04-22 VITALS
SYSTOLIC BLOOD PRESSURE: 128 MMHG | HEIGHT: 70 IN | DIASTOLIC BLOOD PRESSURE: 86 MMHG | WEIGHT: 217 LBS | BODY MASS INDEX: 31.07 KG/M2

## 2024-04-22 DIAGNOSIS — E78.49 OTHER HYPERLIPIDEMIA: ICD-10-CM

## 2024-04-22 DIAGNOSIS — E29.1 MALE HYPOGONADISM: Primary | ICD-10-CM

## 2024-04-22 RX ORDER — TESTOSTERONE GEL, 1% 10 MG/G
50 GEL TRANSDERMAL DAILY
COMMUNITY
Start: 2024-01-29

## 2024-04-22 ASSESSMENT — ENCOUNTER SYMPTOMS
AGITATION: 0
VOICE CHANGE: 0
PHOTOPHOBIA: 0
NAUSEA: 0
CONSTIPATION: 0
ABDOMINAL DISTENTION: 0
HEADACHES: 0
TROUBLE SWALLOWING: 0
PALPITATIONS: 0
DIARRHEA: 0
CONSTITUTIONAL NEGATIVE: 1
TREMORS: 0
SLEEP DISTURBANCE: 0
SORE THROAT: 0
NERVOUS/ANXIOUS: 0
SHORTNESS OF BREATH: 0
ABDOMINAL PAIN: 0
VOMITING: 0
EYE ITCHING: 0
LIGHT-HEADEDNESS: 0
CHEST TIGHTNESS: 0
ARTHRALGIAS: 0
FREQUENCY: 0
DYSURIA: 0

## 2024-04-22 NOTE — PROGRESS NOTES
"Subjective   Patient ID: Jax Monae \"Matheus" is a 48 y.o. male who presents for Hypogonadism (PCP: Dr. Stubbs (manage weight loss)/CSA_ 9/13/23-- needs signed today due to next apt 10/2024 /Current regimen: testosterone 1% packet applying 1 daily  /).  Lab Results   Component Value Date    TESTOSTERONE 1,000 04/09/2024      Lab Results   Component Value Date    PSA 0.48 09/13/2023    PSA 0.47 09/29/2022       HPI    Review of Systems   Constitutional: Negative.    HENT:  Negative for sore throat, trouble swallowing and voice change.    Eyes:  Negative for photophobia, itching and visual disturbance.   Respiratory:  Negative for chest tightness and shortness of breath.    Cardiovascular:  Negative for chest pain and palpitations.   Gastrointestinal:  Negative for abdominal distention, abdominal pain, constipation, diarrhea, nausea and vomiting.   Endocrine: Negative for cold intolerance, heat intolerance and polyuria.   Genitourinary:  Negative for dysuria and frequency.   Musculoskeletal:  Negative for arthralgias.   Skin:  Negative for pallor.   Allergic/Immunologic: Negative for environmental allergies.   Neurological:  Negative for tremors, light-headedness and headaches.   Psychiatric/Behavioral:  Negative for agitation and sleep disturbance. The patient is not nervous/anxious.        Objective   Physical Exam  Constitutional:       Appearance: Normal appearance.   HENT:      Head: Normocephalic.      Nose: Nose normal.      Mouth/Throat:      Mouth: Mucous membranes are moist.   Eyes:      Extraocular Movements: Extraocular movements intact.   Cardiovascular:      Rate and Rhythm: Normal rate.   Pulmonary:      Effort: Pulmonary effort is normal. No respiratory distress.   Abdominal:      General: There is no distension.   Musculoskeletal:         General: Normal range of motion.      Cervical back: Normal range of motion and neck supple.   Skin:     General: Skin is warm and dry.   Neurological:      Mental " "Status: He is alert and oriented to person, place, and time.   Psychiatric:         Mood and Affect: Mood normal.      Visit Vitals  /86   Ht 1.778 m (5' 10\")   Wt 98.4 kg (217 lb)   BMI 31.14 kg/m²   Smoking Status Never   BSA 2.2 m²        Assessment/Plan   Diagnoses and all orders for this visit:  Male hypogonadism  -     CBC; Future  -     Testosterone,Free and Total; Future  -     Sex Hormone Binding Globulin; Future  -     Prolactin; Future  Other hyperlipidemia          # male hypogonadism - unknown cause   has been on TRT since ' 2018  dx with sleep apnea. 2008, not on CPAP , gained weight after that    R hearing loss July 2022. MRI neg for any mass/ lesion   h/o tachycardia   nml puberty , no abd/ pelvic surgeries in past      currently of test gel 50 mg 1 packet daily     9/13/2023  CBC  Testosterone 825  PSA 0.448     4/9/24-testosterone level 1000  Hematocrit 48.5  PSA level 0.53        interval history : He has lost over 100 pounds while on Ozempic he is currently on 2 mg once weekly and will be cutting down to 1 mg once weekly because he has some mild GI side effects.      PLAN:   Since he has lost a lot of weight, his low testosterone could have been associated with obesity we discussed that since he has lost weight we can try taking him off testosterone.   Discussed that since he has been on testosterone replacement for more than 2 years sometimes it could take time as he has had HPA axis suppression for longer period Of time    -Stop testosterone replacement for 2 months advised to do blood work early in the morning fasting between 7 to 8 AM.  He denies any night shifts.    -We signed CSA agreement today  -Discussed symptoms he might experience after stopping testosterone: Fatigue/mood changes/low libido  - will keep a Goal testosterone at this time for him would be more than 300  - discussed SE in details   = he had vasectomy in past      # obesity, BMI > 38 in past , lost more than 100 " pounds  Ozempic being ordered  by Dr. Stubbs    h/o joselin cystectomy, no h/o pancreatitis , no FH of Thyroid cancer   he will follow Dr. Stubbs for weight loss and maintenance      RTc 6m         SH- lives 35 min away.   works as . [place]  kids- 3 kids- D- 21 yo she is a nurse, 21 yo son , D 15 yo    here with daughter Kamilah- she is a nurse and [employer]  he has a step daughter who had a baby 3 months ago , he became a grandpa   had vasectomy in past 2006.   he has a dog who recently had hip replacement    his brother is a  as well

## 2024-05-15 ENCOUNTER — OFFICE VISIT (OUTPATIENT)
Dept: PRIMARY CARE | Facility: CLINIC | Age: 49
End: 2024-05-15
Payer: COMMERCIAL

## 2024-05-15 VITALS
BODY MASS INDEX: 30.85 KG/M2 | SYSTOLIC BLOOD PRESSURE: 120 MMHG | DIASTOLIC BLOOD PRESSURE: 76 MMHG | TEMPERATURE: 98.4 F | WEIGHT: 215 LBS

## 2024-05-15 DIAGNOSIS — E66.9 OBESITY (BMI 35.0-39.9 WITHOUT COMORBIDITY): ICD-10-CM

## 2024-05-15 DIAGNOSIS — G40.909 SEIZURE DISORDER (MULTI): ICD-10-CM

## 2024-05-15 DIAGNOSIS — E78.1 HYPERTRIGLYCERIDEMIA: Primary | ICD-10-CM

## 2024-05-15 RX ORDER — PHENYTOIN SODIUM 100 MG/1
CAPSULE, EXTENDED RELEASE ORAL
Qty: 360 CAPSULE | Refills: 1 | Status: SHIPPED | OUTPATIENT
Start: 2024-05-15

## 2024-05-15 ASSESSMENT — ENCOUNTER SYMPTOMS
CARDIOVASCULAR NEGATIVE: 1
GASTROINTESTINAL NEGATIVE: 1
RESPIRATORY NEGATIVE: 1

## 2024-05-15 NOTE — PROGRESS NOTES
Subjective   Patient ID: Crow Monae is a 48 y.o. male who presents for Med Refill.    Pt presents for follow up    He had shoulder surgery in 9/23, AC joint surgery   He still has some pain in the area    Elevated TG  He stopped the testosterone last month   No alcohol  Limited carbs   Limited fish in his diet          Review of Systems   Respiratory: Negative.     Cardiovascular: Negative.    Gastrointestinal: Negative.        Objective   /76 (BP Location: Right arm, Patient Position: Sitting)   Temp 36.9 °C (98.4 °F)   Wt 97.5 kg (215 lb)   BMI 30.85 kg/m²     Physical Exam  Vitals and nursing note reviewed.   Constitutional:       Appearance: Normal appearance.   Cardiovascular:      Rate and Rhythm: Normal rate and regular rhythm.      Heart sounds: Normal heart sounds.   Pulmonary:      Effort: Pulmonary effort is normal.      Breath sounds: Normal breath sounds.   Abdominal:      General: Bowel sounds are normal.      Palpations: Abdomen is soft.   Neurological:      General: No focal deficit present.      Mental Status: He is alert and oriented to person, place, and time.   Psychiatric:         Mood and Affect: Mood normal.         Behavior: Behavior normal.         Thought Content: Thought content normal.         Judgment: Judgment normal.         Assessment/Plan   Diagnoses and all orders for this visit:  Hypertriglyceridemia  Obesity (BMI 35.0-39.9 without comorbidity)  -     semaglutide 2 mg/dose (8 mg/3 mL) pen injector; Inject 2 mg under the skin 1 (one) time per week.  Seizure disorder (Multi)  -     phenytoin ER (Dilantin) 100 mg capsule; TAKE 4 CAPSULES AT BEDTIME       Alice Stubbs,

## 2024-05-17 ENCOUNTER — TELEPHONE (OUTPATIENT)
Dept: PRIMARY CARE | Facility: CLINIC | Age: 49
End: 2024-05-17
Payer: COMMERCIAL

## 2024-05-28 ENCOUNTER — HOSPITAL ENCOUNTER (OUTPATIENT)
Dept: RADIOLOGY | Facility: CLINIC | Age: 49
Discharge: HOME | End: 2024-05-28
Payer: COMMERCIAL

## 2024-05-28 ENCOUNTER — OFFICE VISIT (OUTPATIENT)
Dept: ORTHOPEDIC SURGERY | Facility: CLINIC | Age: 49
End: 2024-05-28
Payer: COMMERCIAL

## 2024-05-28 DIAGNOSIS — M25.561 ACUTE PAIN OF RIGHT KNEE: ICD-10-CM

## 2024-05-28 DIAGNOSIS — S83.90XA SPRAIN OF KNEE, INITIAL ENCOUNTER: ICD-10-CM

## 2024-05-28 DIAGNOSIS — S83.249A ACUTE MEDIAL MENISCAL TEAR, INITIAL ENCOUNTER: Primary | ICD-10-CM

## 2024-05-28 PROCEDURE — 99214 OFFICE O/P EST MOD 30 MIN: CPT | Performed by: FAMILY MEDICINE

## 2024-05-28 PROCEDURE — 73564 X-RAY EXAM KNEE 4 OR MORE: CPT | Mod: RT

## 2024-05-28 RX ORDER — METHYLPREDNISOLONE 4 MG/1
TABLET ORAL
Qty: 1 EACH | Refills: 0 | Status: SHIPPED | OUTPATIENT
Start: 2024-05-28

## 2024-05-28 NOTE — PROGRESS NOTES
"  Acute Injury New Patient Visit    CC:   Chief Complaint   Patient presents with    Right Knee - Pain     Rt knee pain \"locking\"  Twisted knee stepping over fence  Xrays today       HPI: Jax \"Crow\" is a 48 y.o.male who presents today with new complaints of pain discomfort to the lateral side of the right knee.  Has a history of 2 prior meniscus surgeries to that right knee.  He states he had twisted his knee while stepping over a small garden fence.  He did not have any slip or fall however.  He did not land awkwardly.  He states he felt a large pop and has painful clicking and locking of the knee.  Injury occurred over the holiday weekend, presents here today for further evaluation.  In the past patient has established with Dr. Pj Morgan status post left shoulder arthroscopic procedure.        Review of Systems   GENERAL: Negative for malaise, significant weight loss, fever  MUSCULOSKELETAL: See HPI  NEURO: Negative for numbness / tingling     Past Medical History  Past Medical History:   Diagnosis Date    Personal history of other specified conditions     History of seizure       Medication review  Medication Documentation Review Audit       Reviewed by Cole C Budinsky, MD (Physician) on 05/28/24 at 0855      Medication Order Taking? Sig Documenting Provider Last Dose Status   mometasone (Elocon) 0.1 % cream [id number] No Apply topically 2 times a day. To ear Historical Provider, MD Taking Active   phenytoin ER (Dilantin) 100 mg capsule [id number]  TAKE 4 CAPSULES AT BEDTIME Alice Stubbs, DO  Active   semaglutide 2 mg/dose (8 mg/3 mL) pen injector [id number]  Inject 2 mg under the skin 1 (one) time per week. Alice Stubbs, DO  Active   testosterone 1 % (50 mg/5 gram) gel in packet [id number] No Place 1 packet (50 mg) on the skin once daily. Historical Provider, MD Not Taking Active                    Allergies  No Known Allergies    Social History  Social History     Socioeconomic History    Marital status:  "     Spouse name: Not on file    Number of children: Not on file    Years of education: Not on file    Highest education level: Not on file   Occupational History    Not on file   Tobacco Use    Smoking status: Never     Passive exposure: Never    Smokeless tobacco: Never   Substance and Sexual Activity    Alcohol use: Never    Drug use: Never    Sexual activity: Yes     Partners: Female   Other Topics Concern    Not on file   Social History Narrative    Not on file     Social Determinants of Health     Financial Resource Strain: Not on File (2021)    Received from [facility]     Financial Resource Strain     Financial Resource Strain: 0   Food Insecurity: Not on File (2021)    Received from [facility]     Food Insecurity     Food: 0   Transportation Needs: Not on File (2021)    Received from [facility]     Transportation Needs     Transportation: 0   Physical Activity: Not on File (2021)    Received from [facility]     Physical Activity     Physical Activity: 0   Stress: Not on File (2021)    Received from [facility]     Stress     Stress: 0   Social Connections: Not on File (2021)    Received from [facility]     Social Connections     Social Connections and Isolation: 0   Intimate Partner Violence: Not on file   Housing Stability: Not on File (2021)    Received from [facility]     Housing Stability     Housin       Surgical History  Past Surgical History:   Procedure Laterality Date    SHOULDER SURGERY Left        Physical Exam:  GENERAL:  Patient is awake, alert, and oriented to person place and time.  Patient appears well nourished and well kept.  Affect Calm, Not Acutely Distressed.  HEENT:  Normocephalic, Atraumatic, EOMI  CARDIOVASCULAR:  Hemodynamically stable.  RESPIRATORY:  Normal respirations with unlabored breathing.  NEURO: Gross sensation intact to the lower extremities bilaterally.  Extremity: Right knee exam: The affected Right knee was examined and inspected and was tender to touch along the lateral  aspect with minimal catching, locking, or mechanical symptoms.  The skin was intact without breakdown no open cuts wounds or sores were present. Prior incisions if present were healed without issue. An effusion was present, with a full intact extensor mechanism.  Range of motion of the knee demonstrated normal flexion and slightly limited extension out to [5]degrees.   There was a positive Teresa and Modified Appley exam seen laterally with evidence of instability in the [medial/] collateral ligament ligaments.      Special tests of the knee: Lachman was [/negative], Anterior Drawer was [/negative], and Posterior Drawer was negative.  There was no evidence of any foot drop, numbness, tingling, or burning to the lower extremity.  Lower Extremity sensation, distal pulses, and reflexes in the foot and ankle were preserved.  Patient was able to tolerate minimal full weight bearing secondary to discomfort.  The patient's gait was antalgic secondary to this pain.      Diagnostics: X-rays of the right knee today were negative for fracture or dislocation.        Procedure: None  Procedures    Assessment:   Problem List Items Addressed This Visit    None  Visit Diagnoses       Acute medial meniscal tear, initial encounter    -  Primary    Relevant Medications    methylPREDNISolone (Medrol Dospak) 4 mg tablets    Other Relevant Orders    MR knee right wo IV contrast    Knee Brace, Hinged    Acute pain of right knee        Relevant Medications    methylPREDNISolone (Medrol Dospak) 4 mg tablets    Other Relevant Orders    XR knee right 4+ views    MR knee right wo IV contrast    Knee Brace, Hinged    Sprain of knee, initial encounter        Relevant Medications    methylPREDNISolone (Medrol Dospak) 4 mg tablets             Plan: At this time we discussed the concerning mechanical nature to the lateral side of his right knee.  Will offer him a short course of an oral steroid pack plus or minus an anti-inflammatory.  He will be  given a simple hinged knee brace here today.  He may participate in light activities as tolerated.  Patient would like to follow-up with Dr. Pj Morgan going forward for further evaluation.  In the meantime we will submit for an MRI of the right knee due to concern for lateral meniscus pathology given his mechanical knee symptoms and exam.  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  Orders Placed This Encounter    Knee Brace, Hinged    XR knee right 4+ views    MR knee right wo IV contrast    methylPREDNISolone (Medrol Dospak) 4 mg tablets      At the conclusion of the visit there were no further questions by the patient/family regarding their plan of care.  Patient was instructed to call or return with any issues, questions, or concerns regarding their injury and/or treatment plan described above.     05/28/24 at 5:45 PM - Cole C Budinsky, MD    Office: [phone]    This note was prepared using voice recognition software.  The details of this note are correct and have been reviewed, and corrected to the best of my ability.  Some grammatical errors may persist related to the Dragon software.

## 2024-06-21 ENCOUNTER — TELEPHONE (OUTPATIENT)
Dept: ORTHOPEDIC SURGERY | Facility: CLINIC | Age: 49
End: 2024-06-21

## 2024-06-21 ENCOUNTER — LAB (OUTPATIENT)
Dept: LAB | Facility: LAB | Age: 49
End: 2024-06-21
Payer: COMMERCIAL

## 2024-06-21 DIAGNOSIS — S83.249A ACUTE MEDIAL MENISCAL TEAR, INITIAL ENCOUNTER: ICD-10-CM

## 2024-06-21 DIAGNOSIS — E29.1 MALE HYPOGONADISM: ICD-10-CM

## 2024-06-21 DIAGNOSIS — E78.49 OTHER HYPERLIPIDEMIA: ICD-10-CM

## 2024-06-21 LAB
CHOLEST SERPL-MCNC: 217 MG/DL (ref 0–199)
CHOLESTEROL/HDL RATIO: 4.4
ERYTHROCYTE [DISTWIDTH] IN BLOOD BY AUTOMATED COUNT: 13.3 % (ref 11.5–14.5)
HCT VFR BLD AUTO: 44.8 % (ref 41–52)
HDLC SERPL-MCNC: 49.3 MG/DL
HGB BLD-MCNC: 15.1 G/DL (ref 13.5–17.5)
LDLC SERPL CALC-MCNC: 109 MG/DL
MCH RBC QN AUTO: 29.1 PG (ref 26–34)
MCHC RBC AUTO-ENTMCNC: 33.7 G/DL (ref 32–36)
MCV RBC AUTO: 86 FL (ref 80–100)
NON HDL CHOLESTEROL: 168 MG/DL (ref 0–149)
NRBC BLD-RTO: 0 /100 WBCS (ref 0–0)
PLATELET # BLD AUTO: 179 X10*3/UL (ref 150–450)
PROLACTIN SERPL-MCNC: 8.6 UG/L (ref 2–18)
RBC # BLD AUTO: 5.19 X10*6/UL (ref 4.5–5.9)
TRIGL SERPL-MCNC: 296 MG/DL (ref 0–149)
VLDL: 59 MG/DL (ref 0–40)
WBC # BLD AUTO: 4.4 X10*3/UL (ref 4.4–11.3)

## 2024-06-21 PROCEDURE — 85027 COMPLETE CBC AUTOMATED: CPT

## 2024-06-21 PROCEDURE — 84146 ASSAY OF PROLACTIN: CPT

## 2024-06-21 PROCEDURE — 80061 LIPID PANEL: CPT | Performed by: HOSPITALIST

## 2024-06-21 RX ORDER — METHYLPREDNISOLONE 4 MG/1
TABLET ORAL
Qty: 21 TABLET | Refills: 0 | Status: SHIPPED | OUTPATIENT
Start: 2024-06-21

## 2024-06-22 LAB — SHBG SERPL-SCNC: 52 NMOL/L (ref 17–56)

## 2024-06-24 LAB
TESTOSTERONE FREE (CHAN): 51.8 PG/ML (ref 35–155)
TESTOSTERONE,TOTAL,LC-MS/MS: 467 NG/DL (ref 250–1100)

## 2024-07-01 ENCOUNTER — APPOINTMENT (OUTPATIENT)
Dept: CARDIOLOGY | Facility: CLINIC | Age: 49
End: 2024-07-01
Payer: COMMERCIAL

## 2024-07-08 ENCOUNTER — OFFICE VISIT (OUTPATIENT)
Dept: ORTHOPEDIC SURGERY | Facility: CLINIC | Age: 49
End: 2024-07-08
Payer: COMMERCIAL

## 2024-07-08 DIAGNOSIS — S83.249A ACUTE MEDIAL MENISCAL TEAR, INITIAL ENCOUNTER: Primary | ICD-10-CM

## 2024-07-08 PROCEDURE — 3008F BODY MASS INDEX DOCD: CPT | Performed by: ORTHOPAEDIC SURGERY

## 2024-07-08 PROCEDURE — 99214 OFFICE O/P EST MOD 30 MIN: CPT | Performed by: ORTHOPAEDIC SURGERY

## 2024-07-08 NOTE — PROGRESS NOTES
Chief Complaint   Patient presents with    Right Knee - Establish Care     Twisted his knee stepping over a fence  Known to Dr. Morgan, sent by Dr. Budinsky  X-rays at   MRI done at [facility]     History of Present Illness:  The patient is a 48 y.o. male presenting to clinic as an established patient with complaints of right knee pain. He was last seen in clinic on 11/09/23 s/p left shoulder arthroscopy distal clavicle excision on 09/27/23. He had an MRI of his right knee on 06/19/24 ordered by Dr. Budinsky. He has a history of 2 prior meniscus surgeries to the right knee. He states that he was stepping over a 2ft gardening fence and twisted his knee then felt a pop. He has also clicking and locking of his knee. He states that Medrol Dosepak provided significant relief.     Imaging:  Radiographs Knee: No acute fractures or dislocations.  Shows minimal arthritis    MRI right knee: Shows complex tear posterior horn medial meniscus    Assessment:   Right knee medial meniscus tear    Plan:  We reviewed the role of imaging, physical therapy, injections and the time frame to healing and correlation with outcome.  Get his previous MRI imaging. Follow-up on 7/11/24 to review the results.       Physical Exam:  Well-nourished, well-developed. No acute distress. Alert and oriented x3. Responds appropriately to questioning. Good mood. Normal affect.  Physical Exam  Right Knee:  ROM: Flexion to 120  Pain along medial and lateral joint line  Trace effusion  Positive Teresa´s test/PositiveApley Grind  Neurovascular exam normal distally  Palpable pedal pulse and good cap refill     Review of Systems:  GENERAL: Negative for malaise, significant weight loss, fever  MUSCULOSKELETAL: See HPI  NEURO:  Negative     Past Medical History:   Diagnosis Date    Personal history of other specified conditions     History of seizure       Medication Documentation Review Audit       Reviewed by Melissa Brunner, MA (Medical Assistant) on  06/21/24 at 1648      Medication Order Taking? Sig Documenting Provider Last Dose Status   methylPREDNISolone (Medrol Dospak) 4 mg tablets [id number]  Follow schedule on package instructions Cole C Budinsky, MD  Active   mometasone (Elocon) 0.1 % cream [id number] No Apply topically 2 times a day. To ear Historical Provider, MD Taking Active   phenytoin ER (Dilantin) 100 mg capsule [id number]  TAKE 4 CAPSULES AT BEDTIME Alice Stubbs, DO  Active   semaglutide 2 mg/dose (8 mg/3 mL) pen injector [id number]  Inject 2 mg under the skin 1 (one) time per week. Alice Stubbs, DO  Active   testosterone 1 % (50 mg/5 gram) gel in packet [id number] No Place 1 packet (50 mg) on the skin once daily. Historical Provider, MD Not Taking Active                    No Known Allergies    Social History     Socioeconomic History    Marital status:      Spouse name: Not on file    Number of children: Not on file    Years of education: Not on file    Highest education level: Not on file   Occupational History    Not on file   Tobacco Use    Smoking status: Never     Passive exposure: Never    Smokeless tobacco: Never   Substance and Sexual Activity    Alcohol use: Never    Drug use: Never    Sexual activity: Yes     Partners: Female   Other Topics Concern    Not on file   Social History Narrative    Not on file     Social Determinants of Health     Financial Resource Strain: Not on File (4/6/2021)    Received from [facility]    Financial Resource Strain     Financial Resource Strain: 0   Food Insecurity: Not on File (4/6/2021)    Received from [facility]    Food Insecurity     Food: 0   Transportation Needs: Not on File (4/6/2021)    Received from [facility]    Transportation Needs     Transportation: 0   Physical Activity: Not on File (4/6/2021)    Received from [facility]    Physical Activity     Physical Activity: 0   Stress: Not on File (4/6/2021)    Received from [facility]    Stress     Stress: 0   Social Connections: Not on File (4/6/2021)    Received from  [facility]    Social Connections     Social Connections and Isolation: 0   Intimate Partner Violence: Not on file   Housing Stability: Not on File (2021)    Received from [facility]     Housin       Past Surgical History:   Procedure Laterality Date    SHOULDER SURGERY Left        No results found.       Scribe Attestation  By signing my name below, Shira GUZMÁN Scribe   attest that this documentation has been prepared under the direction and in the presence of Pj Morgan MD.

## 2024-07-08 NOTE — PROGRESS NOTES
Chief Complaint   Patient presents with    Right Knee - Establish Care     [facility], MRI review,        History of Present Illness:  The patient is a 48 y.o. male presenting to clinic as an established patient with complaints of right knee pain. He was last seen in clinic on 11/09/23 s/p left shoulder arthroscopy distal clavicle excision on 09/27/23. He had an MRI of his right knee on 06/19/24 ordered by Dr. Budinsky. He has a history of 2 prior meniscus surgeries to the right knee. He states that he was stepping over a 2ft gardening fence and twisted his knee then felt a pop. He has also clicking and locking of his knee. He states that Medrol Dosepak provided significant relief. He has been experiencing painful locking of his knee.    Imaging:  Radiographs Knee: No acute fractures or dislocations.  Shows minimal arthritis    MRI right knee: Shows complex tear posterior horn medial meniscus.  There is very poor quality images    Assessment:   Right knee medial meniscus tear    Plan:  We discussed the fact that his MRI is such poor quality that I cannot make a clinical decision. We need a clearer imagine, and ideally of greater than or equal to 1.5 azeb. I have personally reviewed the imaging.    We are going to get him scheduled for a repeat MRI that is hopefully more clear.   Follow up once MRI is completed.      Physical Exam:  Well-nourished, well-developed. No acute distress. Alert and oriented x3. Responds appropriately to questioning. Good mood. Normal affect.  Physical Exam  Right Knee:  ROM: Flexion to 120  Pain along medial and lateral joint line  Trace effusion  Positive Teresa´s test/PositiveApley Grind  Neurovascular exam normal distally  Palpable pedal pulse and good cap refill     Review of Systems:  GENERAL: Negative for malaise, significant weight loss, fever  MUSCULOSKELETAL: See HPI  NEURO:  Negative     Past Medical History:   Diagnosis Date    Personal history of other specified conditions      History of seizure       Medication Documentation Review Audit       Reviewed by Melissa Brunner, MA (Medical Assistant) on 06/21/24 at 1648      Medication Order Taking? Sig Documenting Provider Last Dose Status   methylPREDNISolone (Medrol Dospak) 4 mg tablets [id number]  Follow schedule on package instructions Cole C Budinsky, MD  Active   mometasone (Elocon) 0.1 % cream [id number] No Apply topically 2 times a day. To ear Historical Provider, MD Taking Active   phenytoin ER (Dilantin) 100 mg capsule [id number]  TAKE 4 CAPSULES AT BEDTIME Alice Stubbs, DO  Active   semaglutide 2 mg/dose (8 mg/3 mL) pen injector [id number]  Inject 2 mg under the skin 1 (one) time per week. Alice Stubbs, DO  Active   testosterone 1 % (50 mg/5 gram) gel in packet [id number] No Place 1 packet (50 mg) on the skin once daily. Historical Provider, MD Not Taking Active                    No Known Allergies    Social History     Socioeconomic History    Marital status:      Spouse name: Not on file    Number of children: Not on file    Years of education: Not on file    Highest education level: Not on file   Occupational History    Not on file   Tobacco Use    Smoking status: Never     Passive exposure: Never    Smokeless tobacco: Never   Substance and Sexual Activity    Alcohol use: Never    Drug use: Never    Sexual activity: Yes     Partners: Female   Other Topics Concern    Not on file   Social History Narrative    Not on file     Social Determinants of Health     Financial Resource Strain: Not on File (4/6/2021)    Received from [facility]    Financial Resource Strain     Financial Resource Strain: 0   Food Insecurity: Not on File (4/6/2021)    Received from [facility]    Food Insecurity     Food: 0   Transportation Needs: Not on File (4/6/2021)    Received from [facility]    Transportation Needs     Transportation: 0   Physical Activity: Not on File (4/6/2021)    Received from [facility]    Physical Activity     Physical  Activity: 0   Stress: Not on File (2021)    Received from [facility]    Stress     Stress: 0   Social Connections: Not on File (2021)    Received from [facility]    Social Connections     Social Connections and Isolation: 0   Intimate Partner Violence: Not on file   Housing Stability: Not on File (2021)    Received from [facility]    Housing Stability     Housin       Past Surgical History:   Procedure Laterality Date    SHOULDER SURGERY Left        No results found.       Scribe Attestation  By signing my name below, I, Estelita Nolasco   attest that this documentation has been prepared under the direction and in the presence of Pj Morgan MD.

## 2024-07-11 ENCOUNTER — OFFICE VISIT (OUTPATIENT)
Dept: ORTHOPEDIC SURGERY | Facility: CLINIC | Age: 49
End: 2024-07-11
Payer: COMMERCIAL

## 2024-07-11 DIAGNOSIS — S83.249A ACUTE MEDIAL MENISCAL TEAR, INITIAL ENCOUNTER: ICD-10-CM

## 2024-07-11 PROCEDURE — 99213 OFFICE O/P EST LOW 20 MIN: CPT | Performed by: ORTHOPAEDIC SURGERY

## 2024-07-11 PROCEDURE — 3008F BODY MASS INDEX DOCD: CPT | Performed by: ORTHOPAEDIC SURGERY

## 2024-07-19 ENCOUNTER — HOSPITAL ENCOUNTER (OUTPATIENT)
Dept: RADIOLOGY | Facility: CLINIC | Age: 49
Discharge: HOME | End: 2024-07-19
Payer: COMMERCIAL

## 2024-07-19 DIAGNOSIS — S83.249A ACUTE MEDIAL MENISCAL TEAR, INITIAL ENCOUNTER: ICD-10-CM

## 2024-07-19 PROCEDURE — 73721 MRI JNT OF LWR EXTRE W/O DYE: CPT | Mod: RT

## 2024-08-04 NOTE — PROGRESS NOTES
Chief Complaint   Patient presents with    Right Knee - Follow-up     MMT, MRI review,        History of Present Illness:  The patient is a 48 y.o. male presenting to clinic as an established patient with complaints of right knee pain. He was last seen in clinic on 11/09/23 s/p left shoulder arthroscopy distal clavicle excision on 09/27/23. He had an MRI of his right knee on 06/19/24 ordered by Dr. Budinsky. He has a history of 2 prior meniscus surgeries to the right knee. He states that he was stepping over a 2ft gardening fence and twisted his knee then felt a pop. He has also clicking and locking of his knee. He states that Medrol Dosepak provided significant relief. He has been experiencing painful locking of his knee.    Imaging:  Radiographs Knee: No acute fractures or dislocations.  Shows minimal arthritis    MRI right knee: Shows complex tear posterior horn medial meniscus.  There is very poor quality images  Repeat MRI right knee: Shows posterior horn medial meniscus tear with some scar and signal change. He also has some focal grade 3 almost 4 lateral trochlea and lateral patella arthritis.     Assessment:   Right knee medial meniscus tear  Right knee patellofemoral arthritis    Plan:  Continue ice, home exercise, and ibuprofen  We discussed that he may have a underlying recurrent meniscus tear. We discussed possibly looking at meniscectomy, however at this point his mechanical symptoms are slowly starting to dissipate. We will follow-up in 6 weeks, if no better we will do an injection.      Physical Exam:  Well-nourished, well-developed. No acute distress. Alert and oriented x3. Responds appropriately to questioning. Good mood. Normal affect.  Physical Exam  Right Knee:  ROM: Flexion to 120  Trace effusion  Pain along medial and lateralPositive Teresa´s test/PositiveApley Grind  Neurovascular exam normal distally  Palpable pedal pulse and good cap refill     Review of Systems:  GENERAL: Negative for  malaise, significant weight loss, fever  MUSCULOSKELETAL: See HPI  NEURO:  Negative     Past Medical History:   Diagnosis Date    Personal history of other specified conditions     History of seizure       Medication Documentation Review Audit       Reviewed by Dorothy Arboleda MA (Medical Assistant) on 08/05/24 at 0810      Medication Order Taking? Sig Documenting Provider Last Dose Status   methylPREDNISolone (Medrol Dospak) 4 mg tablets [id number]  Follow schedule on package instructions Cole C Budinsky, MD  Active   methylPREDNISolone (Medrol Dospak) 4 mg tablets [id number]  Use as directed by package instructions Cole C Budinsky, MD  Active   mometasone (Elocon) 0.1 % cream [id number] No Apply topically 2 times a day. To ear Historical Provider, MD Taking Active   phenytoin ER (Dilantin) 100 mg capsule [id number]  TAKE 4 CAPSULES AT BEDTIME Alice Stubbs, DO  Active   semaglutide 2 mg/dose (8 mg/3 mL) pen injector [id number]  Inject 2 mg under the skin 1 (one) time per week. Alice Stubbs, DO  Active   testosterone 1 % (50 mg/5 gram) gel in packet [id number] No Place 1 packet (50 mg) on the skin once daily. Historical Provider, MD Not Taking Active                    No Known Allergies    Social History     Socioeconomic History    Marital status:      Spouse name: Not on file    Number of children: Not on file    Years of education: Not on file    Highest education level: Not on file   Occupational History    Not on file   Tobacco Use    Smoking status: Never     Passive exposure: Never    Smokeless tobacco: Never   Substance and Sexual Activity    Alcohol use: Never    Drug use: Never    Sexual activity: Yes     Partners: Female   Other Topics Concern    Not on file   Social History Narrative    Not on file     Social Determinants of Health     Financial Resource Strain: Not on File (4/6/2021)    [facility] from [facility]    Financial Resource Strain     Financial Resource Strain: 0   Food Insecurity: Not on File  (2021)    Received from [facility]    Food Insecurity     Food: 0   Transportation Needs: Not on File (2021)    Received from [facility]    Transportation Needs     Transportation: 0   Physical Activity: Not on File (2021)    Received from [facility]    Physical Activity     Physical Activity: 0   Stress: Not on File (2021)    Received from [facility]    Stress     Stress: 0   Social Connections: Not on File (2021)    Received from [facility]    Social Connections     Social Connections and Isolation: 0   Intimate Partner Violence: Not on file   Housing Stability: Not on File (2021)    Received from [facility]    Housing Stability     Housin       Past Surgical History:   Procedure Laterality Date    SHOULDER SURGERY Left        No results found.       Scribe Attestation  By signing my name below, Shira GUZMÁN Scribe   attest that this documentation has been prepared under the direction and in the presence of Pj Morgan MD.

## 2024-08-05 ENCOUNTER — OFFICE VISIT (OUTPATIENT)
Dept: ORTHOPEDIC SURGERY | Facility: CLINIC | Age: 49
End: 2024-08-05
Payer: COMMERCIAL

## 2024-08-05 VITALS — BODY MASS INDEX: 30.06 KG/M2 | WEIGHT: 210 LBS | HEIGHT: 70 IN

## 2024-08-05 DIAGNOSIS — S83.249A ACUTE MEDIAL MENISCAL TEAR, INITIAL ENCOUNTER: Primary | ICD-10-CM

## 2024-08-05 PROCEDURE — 99214 OFFICE O/P EST MOD 30 MIN: CPT | Performed by: ORTHOPAEDIC SURGERY

## 2024-08-06 ENCOUNTER — APPOINTMENT (OUTPATIENT)
Dept: PRIMARY CARE | Facility: CLINIC | Age: 49
End: 2024-08-06
Payer: COMMERCIAL

## 2024-08-06 VITALS
TEMPERATURE: 97.5 F | OXYGEN SATURATION: 97 % | SYSTOLIC BLOOD PRESSURE: 118 MMHG | RESPIRATION RATE: 18 BRPM | DIASTOLIC BLOOD PRESSURE: 86 MMHG | HEART RATE: 100 BPM | HEIGHT: 70 IN | BODY MASS INDEX: 31.78 KG/M2 | WEIGHT: 222 LBS

## 2024-08-06 DIAGNOSIS — E66.9 OBESITY (BMI 35.0-39.9 WITHOUT COMORBIDITY): ICD-10-CM

## 2024-08-06 DIAGNOSIS — F51.01 PRIMARY INSOMNIA: ICD-10-CM

## 2024-08-06 DIAGNOSIS — G89.29 CHRONIC LOW BACK PAIN WITHOUT SCIATICA, UNSPECIFIED BACK PAIN LATERALITY: ICD-10-CM

## 2024-08-06 DIAGNOSIS — G40.909 SEIZURE DISORDER (MULTI): ICD-10-CM

## 2024-08-06 DIAGNOSIS — M54.50 CHRONIC LOW BACK PAIN WITHOUT SCIATICA, UNSPECIFIED BACK PAIN LATERALITY: ICD-10-CM

## 2024-08-06 DIAGNOSIS — E29.1 MALE HYPOGONADISM: ICD-10-CM

## 2024-08-06 DIAGNOSIS — R53.83 OTHER FATIGUE: Primary | ICD-10-CM

## 2024-08-06 PROCEDURE — 1036F TOBACCO NON-USER: CPT | Performed by: INTERNAL MEDICINE

## 2024-08-06 PROCEDURE — 3008F BODY MASS INDEX DOCD: CPT | Performed by: INTERNAL MEDICINE

## 2024-08-06 PROCEDURE — 99214 OFFICE O/P EST MOD 30 MIN: CPT | Performed by: INTERNAL MEDICINE

## 2024-08-06 RX ORDER — NORTRIPTYLINE HYDROCHLORIDE 10 MG/1
30 CAPSULE ORAL NIGHTLY
Qty: 90 CAPSULE | Refills: 5 | Status: SHIPPED | OUTPATIENT
Start: 2024-08-06 | End: 2024-08-06

## 2024-08-06 RX ORDER — PHENYTOIN SODIUM 100 MG/1
CAPSULE, EXTENDED RELEASE ORAL
Qty: 360 CAPSULE | Refills: 1 | Status: SHIPPED | OUTPATIENT
Start: 2024-08-06

## 2024-08-06 RX ORDER — NORTRIPTYLINE HYDROCHLORIDE 10 MG/1
30 CAPSULE ORAL NIGHTLY
Qty: 90 CAPSULE | Refills: 5 | Status: SHIPPED | OUTPATIENT
Start: 2024-08-06 | End: 2025-02-02

## 2024-08-06 NOTE — PROGRESS NOTES
"Subjective   Patient ID: Crow Monae is a 48 y.o. male who presents for New Patient Visit.    HPI   Re-establishing care.  Overall he has been feeling well.  He has had intentional weight loss and this was in part helped with Ozempic.  He was started on this by his previous physician.  He denies any side effects.  He does admit to stress and anxiety and significant issues sleeping.  This has been a chronic problem for him.  He states his energy levels are somewhat down.  Does have a history of low testosterone.  This was stopped several months ago and repeat testosterone level was normal.  He is interested to recheck this to see what his levels are now.  He also has chronic low back pain on the right side.  This is over the bony prominence close to his PSIS.  He denies any shooting pain down his leg.    Patient reports Colonoscopy 2018  Tdap UTD    Review of Systems    Objective   /86 (BP Location: Left arm, Patient Position: Sitting, BP Cuff Size: Large adult)   Pulse 100   Temp 36.4 °C (97.5 °F) (Skin)   Resp 18   Ht 1.778 m (5' 10\")   Wt 101 kg (222 lb)   SpO2 97%   BMI 31.85 kg/m²     Physical Exam  Vitals reviewed.   Constitutional:       Appearance: Normal appearance.   HENT:      Head: Normocephalic.   Cardiovascular:      Rate and Rhythm: Normal rate and regular rhythm.   Pulmonary:      Effort: Pulmonary effort is normal.      Breath sounds: Normal breath sounds.   Musculoskeletal:         General: Normal range of motion.   Neurological:      General: No focal deficit present.      Mental Status: He is alert.   Psychiatric:         Mood and Affect: Mood normal.         Assessment/Plan   Problem List Items Addressed This Visit             ICD-10-CM    Male hypogonadism E29.1    Relevant Orders    Testosterone, total and free    Seizure disorder (Multi) G40.909    Relevant Medications    phenytoin ER (Dilantin) 100 mg capsule     Other Visit Diagnoses         Codes    Other fatigue    -  Primary " R53.83    Relevant Orders    Testosterone, total and free    Tsh With Reflex To Free T4 If Abnormal    Primary insomnia     F51.01    Relevant Medications    nortriptyline (Pamelor) 10 mg capsule    Chronic low back pain without sciatica, unspecified back pain laterality     M54.50, G89.29    Relevant Orders    XR lumbar spine 2-3 views    Obesity (BMI 35.0-39.9 without comorbidity)     E66.9    Relevant Medications    semaglutide 2 mg/dose (8 mg/3 mL) pen injector        Reviewed and discussed all the above.  His fatigue seems as though it is primarily related to sleep disturbances.  He does have a history of low T.  We will recheck this.  With his intentional weight loss it is likely he should be producing enough that his testosterone levels will be normal again.  We discussed diet exercise and sleep hygiene.  He is agreeable to starting a medicine at night to help him sleep.  For his obesity we will continue with his semaglutide.  He does have a history of a seizure disorder.  However he is been on the same dose of Dilantin for many years without any recurrence.  He does not wish to stop it at the present time.  Will follow-up in 6 months, sooner if any issues

## 2024-08-12 ENCOUNTER — LAB (OUTPATIENT)
Dept: LAB | Facility: LAB | Age: 49
End: 2024-08-12
Payer: COMMERCIAL

## 2024-08-12 DIAGNOSIS — E29.1 MALE HYPOGONADISM: ICD-10-CM

## 2024-08-12 DIAGNOSIS — R53.83 OTHER FATIGUE: ICD-10-CM

## 2024-08-12 LAB — TSH SERPL-ACNC: 1.69 MIU/L (ref 0.44–3.98)

## 2024-08-12 PROCEDURE — 84402 ASSAY OF FREE TESTOSTERONE: CPT

## 2024-08-12 PROCEDURE — 84443 ASSAY THYROID STIM HORMONE: CPT

## 2024-08-12 PROCEDURE — 36415 COLL VENOUS BLD VENIPUNCTURE: CPT

## 2024-08-16 LAB
TESTOSTERONE FREE (CHAN): 69.3 PG/ML (ref 35–155)
TESTOSTERONE,TOTAL,LC-MS/MS: 513 NG/DL (ref 250–1100)

## 2024-08-17 ENCOUNTER — HOSPITAL ENCOUNTER (OUTPATIENT)
Dept: RADIOLOGY | Facility: CLINIC | Age: 49
Discharge: HOME | End: 2024-08-17
Payer: COMMERCIAL

## 2024-08-17 DIAGNOSIS — M54.50 CHRONIC LOW BACK PAIN WITHOUT SCIATICA, UNSPECIFIED BACK PAIN LATERALITY: ICD-10-CM

## 2024-08-17 DIAGNOSIS — G89.29 CHRONIC LOW BACK PAIN WITHOUT SCIATICA, UNSPECIFIED BACK PAIN LATERALITY: ICD-10-CM

## 2024-08-17 PROCEDURE — 72100 X-RAY EXAM L-S SPINE 2/3 VWS: CPT | Performed by: RADIOLOGY

## 2024-08-17 PROCEDURE — 72100 X-RAY EXAM L-S SPINE 2/3 VWS: CPT

## 2024-08-20 ENCOUNTER — APPOINTMENT (OUTPATIENT)
Dept: PRIMARY CARE | Facility: CLINIC | Age: 49
End: 2024-08-20
Payer: COMMERCIAL

## 2024-09-13 ENCOUNTER — APPOINTMENT (OUTPATIENT)
Dept: CARDIOLOGY | Facility: CLINIC | Age: 49
End: 2024-09-13
Payer: COMMERCIAL

## 2024-09-16 NOTE — PROGRESS NOTES
No chief complaint on file.    History of Present Illness:  The patient is a 48 y.o. male presenting to clinic as an established patient with complaints of right knee pain. He was last seen in clinic on 11/09/23 s/p left shoulder arthroscopy distal clavicle excision on 09/27/23. He had an MRI of his right knee on 06/19/24 ordered by Dr. Budinsky. He has a history of 2 prior meniscus surgeries to the right knee. He states that he was stepping over a 2ft gardening fence and twisted his knee then felt a pop. He has also clicking and locking of his knee. He states that Medrol Dosepak provided significant relief. He has been experiencing painful locking of his knee.    Imaging:  Radiographs Knee: No acute fractures or dislocations.  Shows minimal arthritis    MRI right knee: Shows complex tear posterior horn medial meniscus.  There is very poor quality images  Repeat MRI right knee: Shows posterior horn medial meniscus tear with some scar and signal change. He also has some focal grade 3 almost 4 lateral trochlea and lateral patella arthritis.     Assessment:   Right knee medial meniscus tear  Right knee patellofemoral arthritis    Plan:  Continue ice, home exercise, and ibuprofen  We discussed that he may have a underlying recurrent meniscus tear. We discussed possibly looking at meniscectomy, however at this point his mechanical symptoms are slowly starting to dissipate. We will follow-up in 6 weeks, if no better we will do an injection.      Physical Exam:  Well-nourished, well-developed. No acute distress. Alert and oriented x3. Responds appropriately to questioning. Good mood. Normal affect.  Physical Exam  Right Knee:  ROM: Flexion to 120  Trace effusion  Pain along medial and lateralPositive Teresa´s test/PositiveApley Grind  Neurovascular exam normal distally  Palpable pedal pulse and good cap refill     Review of Systems:  GENERAL: Negative for malaise, significant weight loss, fever  MUSCULOSKELETAL: See  HPI  NEURO:  Negative     Past Medical History:   Diagnosis Date    Personal history of other specified conditions     History of seizure       Medication Documentation Review Audit       Reviewed by Nithin Olivera DO (Physician) on 08/06/24 at 0939      Medication Order Taking? Sig Documenting Provider Last Dose Status   methylPREDNISolone (Medrol Dospak) 4 mg tablets [id number] No Follow schedule on package instructions   Patient not taking: Reported on 8/6/2024    Cole C Budinsky, MD Not Taking Flag for Review   methylPREDNISolone (Medrol Dospak) 4 mg tablets [id number] No Use as directed by package instructions   Patient not taking: Reported on 8/6/2024    Cole C Budinsky, MD Not Taking Flag for Review   mometasone (Elocon) 0.1 % cream [id number] Yes Apply topically 2 times a day. To ear Historical Provider, MD Taking Active   phenytoin ER (Dilantin) 100 mg capsule [id number] Yes TAKE 4 CAPSULES AT BEDTIME Alice Stubbs, DO Taking Active   semaglutide 2 mg/dose (8 mg/3 mL) pen injector [id number] Yes Inject 2 mg under the skin 1 (one) time per week. Alice Stubbs DO Taking Active   testosterone 1 % (50 mg/5 gram) gel in packet [id number] No Place 1 packet (50 mg) on the skin once daily. Historical Provider, MD Not Taking Active                    No Known Allergies    Social History     Socioeconomic History    Marital status:      Spouse name: Not on file    Number of children: Not on file    Years of education: Not on file    Highest education level: Not on file   Occupational History    Not on file   Tobacco Use    Smoking status: Never     Passive exposure: Never    Smokeless tobacco: Never   Substance and Sexual Activity    Alcohol use: Never    Drug use: Never    Sexual activity: Yes     Partners: Female   Other Topics Concern    Not on file   Social History Narrative    Not on file     Social Determinants of Health     Financial Resource Strain: Not on File (4/6/2021)    [facility] from [facility]     Financial Resource Strain     Financial Resource Strain: 0   Food Insecurity: Not on File (2021)    Received from [facility]    Food Insecurity     Food: 0   Transportation Needs: Not on File (2021)    Received from [facility]    Transportation Needs     Transportation: 0   Physical Activity: Not on File (2021)    Received from [facility]    Physical Activity     Physical Activity: 0   Stress: Not on File (2021)    Received from [facility]    Stress     Stress: 0   Social Connections: Not on File (2021)    Received from [facility]    Social Connections     Social Connections and Isolation: 0   Intimate Partner Violence: Not on file   Housing Stability: Not on File (2021)    Received from [facility]    Housing Stability     Housin       Past Surgical History:   Procedure Laterality Date    SHOULDER SURGERY Left        No results found.       Scribe Attestation  By signing my name below, Shira GUZMÁN Scribe   attest that this documentation has been prepared under the direction and in the presence of Pj Morgan MD.

## 2024-09-17 ENCOUNTER — APPOINTMENT (OUTPATIENT)
Dept: ORTHOPEDIC SURGERY | Facility: CLINIC | Age: 49
End: 2024-09-17
Payer: COMMERCIAL

## 2024-10-14 ENCOUNTER — APPOINTMENT (OUTPATIENT)
Dept: ENDOCRINOLOGY | Facility: CLINIC | Age: 49
End: 2024-10-14
Payer: COMMERCIAL

## 2025-01-02 ENCOUNTER — OFFICE VISIT (OUTPATIENT)
Dept: CARDIOLOGY | Facility: CLINIC | Age: 50
End: 2025-01-02
Payer: COMMERCIAL

## 2025-01-02 VITALS
HEART RATE: 77 BPM | DIASTOLIC BLOOD PRESSURE: 88 MMHG | SYSTOLIC BLOOD PRESSURE: 118 MMHG | WEIGHT: 226 LBS | BODY MASS INDEX: 32.35 KG/M2 | HEIGHT: 70 IN

## 2025-01-02 DIAGNOSIS — R00.2 PALPITATIONS: Primary | ICD-10-CM

## 2025-01-02 PROBLEM — R07.89 CHEST TIGHTNESS: Status: RESOLVED | Noted: 2023-03-20 | Resolved: 2025-01-02

## 2025-01-02 PROBLEM — R07.9 CHEST PAIN: Status: RESOLVED | Noted: 2023-03-20 | Resolved: 2025-01-02

## 2025-01-02 PROBLEM — E78.5 HYPERLIPEMIA: Status: RESOLVED | Noted: 2023-03-20 | Resolved: 2025-01-02

## 2025-01-02 PROCEDURE — 99213 OFFICE O/P EST LOW 20 MIN: CPT | Performed by: NURSE PRACTITIONER

## 2025-01-02 PROCEDURE — 3008F BODY MASS INDEX DOCD: CPT | Performed by: NURSE PRACTITIONER

## 2025-01-02 PROCEDURE — 1036F TOBACCO NON-USER: CPT | Performed by: NURSE PRACTITIONER

## 2025-01-02 ASSESSMENT — ENCOUNTER SYMPTOMS
DYSPNEA ON EXERTION: 0
PND: 0
PALPITATIONS: 1
ORTHOPNEA: 0
NEAR-SYNCOPE: 0
SYNCOPE: 0
IRREGULAR HEARTBEAT: 0

## 2025-01-02 NOTE — LETTER
"January 2, 2025     Nithin Olivera DO  [address] [facility] [place] [address]    Patient: Crow Monae   YOB: 1975   Date of Visit: 1/2/2025       Dear Dr. Nithin Olivera DO:    Thank you for referring Crow Monae to me for evaluation. Below are my notes for this consultation.  If you have questions, please do not hesitate to call me. I look forward to following your patient along with you.       Sincerely,     Jazzmine Flores, APRN-CNP      CC: No Recipients  ______________________________________________________________________________________    Chief Complaint  \"My watch recorded I was having atrial fib\"    Reason for Visit  Add-on  Patient presents to the office today for outpatient follow-up for palpitations.   Evaluated by Dr. Camacho August 2024 hospitalization due to syncopal episode.  He did not follow-up with cardiology thereafter.    Presents today ambulatory with steady gait.  Accompanied by patient  Patient denies any hospitalizations or significant changes to interval medical history since last office follow-up.     History of Present Illness   Patient is a very pleasant 49-year-old gentleman who presents to the office today with concerns regarding his Apple Watch documenting atrial fibrillation.  He reports that over the weekend he had a stomach virus with nausea and vomiting and approximately 1:00 in the morning his Apple Watch picked up atrial fibrillation with heart rates in the 130s (tracings were reviewed and difficult to see if this is truly A-fib, may be PACs) he reports that throughout the night heart rate was sometimes 170-180.  He had palpitations and really just overall generalized illness from gastritis.    He is maintained on no AV yadiel blocking agent.  He has significantly reduced his prior excessive caffeine intake and now has 1 yellow monster in the morning and a large coffee.    He goes on to report that his Apple Watch very frequently picks up heart rates upwards " "to 140s-this can sometimes make him feel a little lightheaded.  After the episode of syncope while exercising over the summer he has been somewhat reluctant to workout and is worried that he will pass out again.  With some light activity at work he noticed palpitations with a heart rate of 157 for approximately 2 hours.    He denies any exertional chest pain, no dyspnea on exertion.    CHADS VASc 0    Reports routine lab work.    Patient reports that overall has no complaint(s) of chest pain, chest pressure/discomfort, claudication, dyspnea, exertional chest pressure/discomfort, and fatigue    Daily activity:    Greater than 4 METS  Denies any change in exercise capacity or functional tolerance since last office visit.    EKG in office normal sinus rhythm, no evidence of preexcitation.    Review of Systems   Cardiovascular:  Positive for palpitations. Negative for chest pain, dyspnea on exertion, irregular heartbeat, leg swelling, near-syncope, orthopnea, paroxysmal nocturnal dyspnea and syncope.        Visit Vitals  /88 (BP Location: Right arm, Patient Position: Sitting)   Pulse 77   Ht 1.778 m (5' 10\")   Wt 103 kg (226 lb)   BMI 32.43 kg/m²   Smoking Status Never   BSA 2.26 m²     Physical Exam  Vitals and nursing note reviewed.   Constitutional:       Appearance: Normal appearance.   Cardiovascular:      Rate and Rhythm: Normal rate and regular rhythm.      Heart sounds: Normal heart sounds.   Pulmonary:      Effort: Pulmonary effort is normal.      Breath sounds: Normal breath sounds.   Musculoskeletal:      Cervical back: Full passive range of motion without pain.      Right lower leg: No edema.      Left lower leg: No edema.   Skin:     General: Skin is cool.   Neurological:      Mental Status: He is alert and oriented to person, place, and time.   Psychiatric:         Attention and Perception: Attention normal.         Mood and Affect: Mood normal.         Behavior: Behavior is cooperative.      No " Known Allergies    Current Outpatient Medications   Medication Instructions   • mometasone (Elocon) 0.1 % cream 2 times daily   • phenytoin ER (Dilantin) 100 mg capsule TAKE 4 CAPSULES AT BEDTIME   • semaglutide 2 mg, subcutaneous, Once Weekly   • testosterone (ANDROGEL) 50 mg, Daily      Assessment:    Pleasant 49-year-old gentleman presents with palpitations with Apple Watch questionable transient atrial fibrillation during GI illness; frequent palpitations with heart rates in excess of 150.  Prior cardiovascular testing:    September 2020:   KOH PVC  GXT no ischemia at 10 METs  TTE EF 60-65%    For now, will complete [facility] to more accurately diagnose tachyarrhythmias.  An echocardiogram was recommended following September 2024 hospitalization for syncopal episode, will complete at this time.  Will also complete GXT to verify heart rate with exercise and provide some guidance regarding resuming exercise program.  He will follow-up after testing completed.    Plan:     Through informed decision making process incorporating patients unique circumstances, the following treatment plan will be initiated:    1.  Prescription drug management of cardiovascular medication for efficacy, adherence to treatment, side effect assessment and polypharmacy. Current treatment clinically warranted and to continue without modifications.    2.  KOH 30 days (palpitations)    3.  Echo (palpitations, syncope)    4.  GXT (palpitations, syncope)    5. Return for follow-up; in the interim, contact the office if new symptoms arise.  NP after testing    Jazzmine Flores MSN, APRN-CNP, PMHNP-[facility]  [place], [place]     Please excuse any errors in grammar or translation related to this dictation. Voice recognition software was utilized to prepare this document.

## 2025-01-02 NOTE — PATIENT INSTRUCTIONS
Please bring all medicines, vitamins, and herbal supplements with you when you come to the office.    Prescriptions will not be filled unless you are compliant with your follow up appointments or have a follow up appointment scheduled as per instruction of your physician. Refills should be requested at the time of your visit.    EKG done in office today    PLAN:   Through informed decision making process incorporating patients unique circumstances, the following treatment plan will be initiated:    1.  Prescription drug management of cardiovascular medication for efficacy, adherence to treatment, side effect assessment and polypharmacy. Current treatment clinically warranted and to continue without modifications.    2.  KOH 30 days (palpitations)    3.  Echo (palpitations, syncope)    4.  GXT (palpitations, syncope)    5. Return for follow-up; in the interim, contact the office if new symptoms arise.  NP after testing

## 2025-01-02 NOTE — PROGRESS NOTES
"Chief Complaint  \"My watch recorded I was having atrial fib\"    Reason for Visit  Add-on  Patient presents to the office today for outpatient follow-up for palpitations.   Evaluated by Dr. Camacho August 2024 hospitalization due to syncopal episode.  He did not follow-up with cardiology thereafter.    Presents today ambulatory with steady gait.  Accompanied by patient  Patient denies any hospitalizations or significant changes to interval medical history since last office follow-up.     History of Present Illness   Patient is a very pleasant 49-year-old gentleman who presents to the office today with concerns regarding his Apple Watch documenting atrial fibrillation.  He reports that over the weekend he had a stomach virus with nausea and vomiting and approximately 1:00 in the morning his Apple Watch picked up atrial fibrillation with heart rates in the 130s (tracings were reviewed and difficult to see if this is truly A-fib, may be PACs) he reports that throughout the night heart rate was sometimes 170-180.  He had palpitations and really just overall generalized illness from gastritis.    He is maintained on no AV yadiel blocking agent.  He has significantly reduced his prior excessive caffeine intake and now has 1 yellow monster in the morning and a large coffee.    He goes on to report that his Apple Watch very frequently picks up heart rates upwards to 140s-this can sometimes make him feel a little lightheaded.  After the episode of syncope while exercising over the summer he has been somewhat reluctant to workout and is worried that he will pass out again.  With some light activity at work he noticed palpitations with a heart rate of 157 for approximately 2 hours.    He denies any exertional chest pain, no dyspnea on exertion.    CHADS VASc 0    Reports routine lab work.    Patient reports that overall has no complaint(s) of chest pain, chest pressure/discomfort, claudication, dyspnea, exertional chest " "pressure/discomfort, and fatigue    Daily activity:    Greater than 4 METS  Denies any change in exercise capacity or functional tolerance since last office visit.    EKG in office normal sinus rhythm, no evidence of preexcitation.    Review of Systems   Cardiovascular:  Positive for palpitations. Negative for chest pain, dyspnea on exertion, irregular heartbeat, leg swelling, near-syncope, orthopnea, paroxysmal nocturnal dyspnea and syncope.        Visit Vitals  /88 (BP Location: Right arm, Patient Position: Sitting)   Pulse 77   Ht 1.778 m (5' 10\")   Wt 103 kg (226 lb)   BMI 32.43 kg/m²   Smoking Status Never   BSA 2.26 m²     Physical Exam  Vitals and nursing note reviewed.   Constitutional:       Appearance: Normal appearance.   Cardiovascular:      Rate and Rhythm: Normal rate and regular rhythm.      Heart sounds: Normal heart sounds.   Pulmonary:      Effort: Pulmonary effort is normal.      Breath sounds: Normal breath sounds.   Musculoskeletal:      Cervical back: Full passive range of motion without pain.      Right lower leg: No edema.      Left lower leg: No edema.   Skin:     General: Skin is cool.   Neurological:      Mental Status: He is alert and oriented to person, place, and time.   Psychiatric:         Attention and Perception: Attention normal.         Mood and Affect: Mood normal.         Behavior: Behavior is cooperative.      No Known Allergies    Current Outpatient Medications   Medication Instructions    mometasone (Elocon) 0.1 % cream 2 times daily    phenytoin ER (Dilantin) 100 mg capsule TAKE 4 CAPSULES AT BEDTIME    semaglutide 2 mg, subcutaneous, Once Weekly    testosterone (ANDROGEL) 50 mg, Daily      Assessment:    Pleasant 49-year-old gentleman presents with palpitations with Apple Watch questionable transient atrial fibrillation during GI illness; frequent palpitations with heart rates in excess of 150.  Prior cardiovascular testing:    September 2020:   KOH PVC  GXT no " ischemia at 10 METs  TTE EF 60-65%    For now, will complete [facility] to more accurately diagnose tachyarrhythmias.  An echocardiogram was recommended following September 2024 hospitalization for syncopal episode, will complete at this time.  Will also complete GXT to verify heart rate with exercise and provide some guidance regarding resuming exercise program.  He will follow-up after testing completed.    Plan:     Through informed decision making process incorporating patients unique circumstances, the following treatment plan will be initiated:    1.  Prescription drug management of cardiovascular medication for efficacy, adherence to treatment, side effect assessment and polypharmacy. Current treatment clinically warranted and to continue without modifications.    2.  KOH 30 days (palpitations)    3.  Echo (palpitations, syncope)    4.  GXT (palpitations, syncope)    5. Return for follow-up; in the interim, contact the office if new symptoms arise.  NP after testing    Jazzmine Flores MSN, APRN-CNP, PMHNP-[facility]  [place], [place]     Please excuse any errors in grammar or translation related to this dictation. Voice recognition software was utilized to prepare this document.

## 2025-01-21 ENCOUNTER — HOSPITAL ENCOUNTER (OUTPATIENT)
Dept: CARDIOLOGY | Facility: CLINIC | Age: 50
Discharge: HOME | End: 2025-01-21
Payer: COMMERCIAL

## 2025-01-21 ENCOUNTER — APPOINTMENT (OUTPATIENT)
Dept: CARDIOLOGY | Facility: CLINIC | Age: 50
End: 2025-01-21
Payer: COMMERCIAL

## 2025-01-21 VITALS — DIASTOLIC BLOOD PRESSURE: 86 MMHG | HEART RATE: 96 BPM | SYSTOLIC BLOOD PRESSURE: 132 MMHG

## 2025-01-21 DIAGNOSIS — R00.2 PALPITATIONS: ICD-10-CM

## 2025-01-21 PROCEDURE — 93016 CV STRESS TEST SUPVJ ONLY: CPT | Performed by: INTERNAL MEDICINE

## 2025-01-21 PROCEDURE — 93017 CV STRESS TEST TRACING ONLY: CPT

## 2025-01-21 PROCEDURE — 93018 CV STRESS TEST I&R ONLY: CPT | Performed by: INTERNAL MEDICINE

## 2025-01-23 ENCOUNTER — TELEPHONE (OUTPATIENT)
Dept: CARDIOLOGY | Facility: CLINIC | Age: 50
End: 2025-01-23
Payer: COMMERCIAL

## 2025-01-23 NOTE — TELEPHONE ENCOUNTER
----- Message from Jazzmine Flores sent at 1/23/2025 10:19 AM EST -----  Please let him know stress test did not show any ischemia or arrhythmia with exercise.  He is ok to start exercise routine  ----- Message -----  From: Warren Syngo - Cardiology Results In  Sent: 1/21/2025   5:13 PM EST  To: GINGER Herron-CNP

## 2025-01-23 NOTE — TELEPHONE ENCOUNTER
Result Communication    Resulted Orders   Stress Test    Narrative                   [facility]  [address] [place] [address], [address], [place], [place] [address]          Tel [phone] Fax [phone]    Exercise Stress Test    Patient Name:      ANA MARIA MUIR      Ordering Provider:     [id number] YM HUGO  Study Date:        1/21/2025           Reading Physician:     [id number] Jania Caraballo MD  MRN/PID:           [id number]            Supervising Physician: [id number] Shay Camacho DO  Accession#:        [id number]        Fellow:  Date of Birth/Age: 1975 / 49     Fellow:                     years  Gender:            M                   Nurse:                 Cesario Story RN  Admission Status:                      Sonographer:           NA  Height:            177.80 cm           Technologist:  Weight:            102.51 kg           Additional Staff:  BSA:               2.20 m2             Encounter#:            [id number]  BMI:               32.43 kg/m2         Patient Location:    Study Type:    STRESS TEST ONLY  Diagnosis/ICD: Palpitations-R00.2  Indication:    Syncope  CPT Codes:     Stress Test Interpretation-65256; Stress Test Supervision-36401    Falls Risk: Low: Patient has low risk for sustaining a fall; environmental safety interventions in place.     REPORT AMENDED:  Report has been modified by [id number] Jania Caraballo MD on 1/22/2025 5:01:28 PM due to correction.     Study Details: Correct procedure and correct patient verified verbally.       Patient History:  Allergies: None.       Patient Performance: The peak heart rate achieved was 179 bpm, which was 104 % of the age predicted target heart rate of 172 bpm. The resting blood pressure was 132/86 mmHg with a heart rate of 96 bpm. The standing blood pressure  was 128/86 mmHg with a heart rate of 87 bpm. The patient's functional capacity was below average. The patient developed fatigue during the stress exam. The symptoms resolved with rest. The blood pressure response was normal. The test was terminated due to: fatigue. Sinus tachycardia with upsloping ST segment depression.     Double Product (HR x BP): 294.       Baseline ECG: Resting ECG showed normal sinus rhythm with occasional premature ventricular contractions. Normal sinus rhythm with nonspecific ST-T changes.     Stress Stage Data:  +-----------------+---+------+-------+                   HR Sys BPDias BP  +-----------------+---+------+-------+  Baseline Resting 96 132   86       +-----------------+---+------+-------+  Baseline [id number] 128   86       +-----------------+---+------+-------+  Stage I          [id number]   78       +-----------------+---+------+-------+  Stage II         [id number]   76       +-----------------+---+------+-------+  Stage III        [id number]   72       +-----------------+---+------+-------+       Recovery ECG: The heart rate recovery was normal.     +------------+---+------+-------+              HR Sys BPDias BP  +------------+---+------+-------+  Recovery I  [id number]   74       +------------+---+------+-------+  Recovery II [id number]   78       +------------+---+------+-------+  Recovery [id number]   86       +------------+---+------+-------+  Recovery IV [id number]   86       +------------+---+------+-------+  Recovery V  114               +------------+---+------+-------+       Summary:     1. Graded exercise stress test with nondiagnostic ST-T changes for ischemia.   2. No provoked chest pain or arrhythmia.   3. Appropriate hemodynamic response to exercise.   4. Fair exercise tolerance.   5. Normal heart rate recovery phase.   6. Patient was able to exercise for a total of 9 minutes achieving workload of 10.1 METS and 104% of maximum  predicted heart rate.   7. This clinically negative study.    [id number] Jania Caraballo MD  Electronically signed on 1/21/2025 at 5:12:59 PM                      ** Final (Updated) **         3:32 PM      Results were successfully communicated with the patient and they acknowledged their understanding.

## 2025-01-31 ENCOUNTER — APPOINTMENT (OUTPATIENT)
Dept: CARDIOLOGY | Facility: CLINIC | Age: 50
End: 2025-01-31
Payer: COMMERCIAL

## 2025-02-06 ENCOUNTER — APPOINTMENT (OUTPATIENT)
Dept: CARDIOLOGY | Facility: CLINIC | Age: 50
End: 2025-02-06
Payer: COMMERCIAL

## 2025-02-06 ENCOUNTER — APPOINTMENT (OUTPATIENT)
Dept: PRIMARY CARE | Facility: CLINIC | Age: 50
End: 2025-02-06
Payer: COMMERCIAL

## 2025-02-06 VITALS
DIASTOLIC BLOOD PRESSURE: 82 MMHG | RESPIRATION RATE: 17 BRPM | HEIGHT: 70 IN | TEMPERATURE: 97.6 F | WEIGHT: 229 LBS | SYSTOLIC BLOOD PRESSURE: 124 MMHG | OXYGEN SATURATION: 100 % | BODY MASS INDEX: 32.78 KG/M2 | HEART RATE: 85 BPM

## 2025-02-06 DIAGNOSIS — Z12.5 SCREENING FOR PROSTATE CANCER: ICD-10-CM

## 2025-02-06 DIAGNOSIS — E66.9 OBESITY (BMI 35.0-39.9 WITHOUT COMORBIDITY): ICD-10-CM

## 2025-02-06 DIAGNOSIS — R55 SYNCOPE, UNSPECIFIED SYNCOPE TYPE: ICD-10-CM

## 2025-02-06 DIAGNOSIS — Z00.00 PERIODIC HEALTH ASSESSMENT, GENERAL SCREENING, ADULT: ICD-10-CM

## 2025-02-06 DIAGNOSIS — J00 ACUTE RHINITIS: ICD-10-CM

## 2025-02-06 DIAGNOSIS — G40.909 SEIZURE DISORDER (MULTI): ICD-10-CM

## 2025-02-06 DIAGNOSIS — E29.1 MALE HYPOGONADISM: Primary | ICD-10-CM

## 2025-02-06 PROCEDURE — 99214 OFFICE O/P EST MOD 30 MIN: CPT | Performed by: INTERNAL MEDICINE

## 2025-02-06 PROCEDURE — 3008F BODY MASS INDEX DOCD: CPT | Performed by: INTERNAL MEDICINE

## 2025-02-06 PROCEDURE — 1036F TOBACCO NON-USER: CPT | Performed by: INTERNAL MEDICINE

## 2025-02-06 RX ORDER — PHENYTOIN SODIUM 100 MG/1
CAPSULE, EXTENDED RELEASE ORAL
Qty: 360 CAPSULE | Refills: 1 | Status: SHIPPED | OUTPATIENT
Start: 2025-02-06

## 2025-02-06 RX ORDER — KETOCONAZOLE 20 MG/ML
SHAMPOO, SUSPENSION TOPICAL
COMMUNITY
Start: 2025-02-03

## 2025-02-06 NOTE — PROGRESS NOTES
"Subjective   Patient ID: Crow Monae is a 49 y.o. male who presents for Follow-up.    Needs refill of semaglutide  Patient complains of possible sinus infection x3 weeks; nasal congestion and headaches, occasional cough.  Chest symptoms cleared up.  Sinus irritation continues.  Clear and bloody drainage.  No purulent drainage.   Has Holter monitor for HR's that have been higher.    He was running and passed out after.  Possible a fib.  Following with cardiology.    No recurrent issues.    He has a seizure history, but seizures for 30 + years.  He would prefer to continue dilantin.      Review of Systems  ROS is otherwise unremarkable.      Objective   /82   Pulse 85   Temp 36.4 °C (97.6 °F) (Temporal)   Resp 17   Ht 1.778 m (5' 10\")   Wt 104 kg (229 lb) Comment: with shoes  SpO2 100%   BMI 32.86 kg/m²     Physical Exam  Constitutional:       General: He is not in acute distress.     Appearance: Normal appearance. He is not ill-appearing.   HENT:      Head: Normocephalic and atraumatic.      Nose: Nose normal.   Eyes:      Extraocular Movements: Extraocular movements intact.      Conjunctiva/sclera: Conjunctivae normal.      Pupils: Pupils are equal, round, and reactive to light.   Cardiovascular:      Rate and Rhythm: Normal rate and regular rhythm.   Pulmonary:      Effort: Pulmonary effort is normal.      Breath sounds: Normal breath sounds.   Abdominal:      General: There is no distension.   Musculoskeletal:         General: Normal range of motion.      Cervical back: Neck supple.   Neurological:      General: No focal deficit present.      Mental Status: He is alert.      Gait: Gait normal.   Psychiatric:         Mood and Affect: Mood normal.         Behavior: Behavior normal.         Assessment/Plan   Problem List Items Addressed This Visit             ICD-10-CM    Male hypogonadism - Primary E29.1    Seizure disorder (Multi) G40.909    Relevant Medications    phenytoin ER (Dilantin) 100 mg " capsule    Other Relevant Orders    Phenytoin level, total    Phenytoin level, free     Other Visit Diagnoses         Codes    Periodic health assessment, general screening, adult     Z00.00    Relevant Orders    CBC    Comprehensive Metabolic Panel    Lipid Panel    Thyroid Stimulating Hormone    Testosterone, total and free    Screening for prostate cancer     Z12.5    Relevant Orders    Prostate Specific Antigen    Obesity (BMI 35.0-39.9 without comorbidity)     E66.9    Relevant Medications    semaglutide 2 mg/dose (8 mg/3 mL) pen injector (Start on 2/9/2025)    Syncope, unspecified syncope type     R55    Acute rhinitis     J00        He wishes to continue Ozempic.  He has lost about 100 pounds and has been feeling well.   We stressed need to stay well hydrated.    Recently diagnosed with low T and on replacement therapy.  No clinical side effects.  We will monitor his PSA and CBC.    We discussed rhinitis.  Currently seems consistent with dry irritation.  We will treat with intra nasal steroid, humidifier and saline spray.  If any changes his to call for possible antibiotics.  He has been on antibiotics for his prostate - several rounds - and we discussed need to limit use/overuse.   Follow up in 6 months - sooner if any issues.

## 2025-02-12 ENCOUNTER — HOSPITAL ENCOUNTER (OUTPATIENT)
Dept: CARDIOLOGY | Facility: CLINIC | Age: 50
Discharge: HOME | End: 2025-02-12
Payer: COMMERCIAL

## 2025-02-12 DIAGNOSIS — R00.2 PALPITATIONS: ICD-10-CM

## 2025-02-12 PROCEDURE — 93306 TTE W/DOPPLER COMPLETE: CPT | Performed by: INTERNAL MEDICINE

## 2025-02-12 PROCEDURE — 93306 TTE W/DOPPLER COMPLETE: CPT

## 2025-02-13 LAB
AORTIC VALVE MEAN GRADIENT: 5 MMHG
AORTIC VALVE PEAK VELOCITY: 1.31 M/S
AV PEAK GRADIENT: 7 MMHG
AVA (PEAK VEL): 3.49 CM2
AVA (VTI): 3.66 CM2
EJECTION FRACTION APICAL 4 CHAMBER: 58.4
EJECTION FRACTION: 68 %
LEFT ATRIUM VOLUME AREA LENGTH INDEX BSA: 20.8 ML/M2
LEFT VENTRICLE INTERNAL DIMENSION DIASTOLE: 4.44 CM (ref 3.5–6)
LEFT VENTRICULAR OUTFLOW TRACT DIAMETER: 2.18 CM
LV EJECTION FRACTION BIPLANE: 54 %
MITRAL VALVE E/A RATIO: 1.12
MITRAL VALVE E/E' RATIO: 7.14
RIGHT VENTRICLE FREE WALL PEAK S': 14.68 CM/S
RIGHT VENTRICLE PEAK SYSTOLIC PRESSURE: 28.4 MMHG
TRICUSPID ANNULAR PLANE SYSTOLIC EXCURSION: 2.6 CM

## 2025-02-14 ENCOUNTER — TELEPHONE (OUTPATIENT)
Dept: CARDIOLOGY | Facility: CLINIC | Age: 50
End: 2025-02-14
Payer: COMMERCIAL

## 2025-02-14 NOTE — TELEPHONE ENCOUNTER
----- Message from Jazzmine Flores sent at 2/13/2025 11:26 AM EST -----  Echo shows no significant concerns.  Heart muscle function 65%  No structural heart disease  ----- Message -----  From: Warren, Syngo - Cardiology Results In  Sent: 2/13/2025   8:25 AM EST  To: ANNMARIE HerronCNP

## 2025-02-14 NOTE — TELEPHONE ENCOUNTER
Result Communication    Resulted Orders   Transthoracic Echo Complete   Result Value Ref Range    AV mn grad 5 mmHg    AV pk charla 1.31 m/s    LV Biplane EF 54 %    LVOT diam 2.18 cm    MV E/A ratio 1.12     Tricuspid annular plane systolic excursion 2.6 cm    MV avg E/e' ratio 7.14     LA vol index A/L 20.8 ml/m2    LV EF 68 %    RV free wall pk S' 14.68 cm/s    RVSP 28.4 mmHg    LVIDd 4.44 cm    Aortic Valve Area by Continuity of Peak Velocity 3.49 cm2    AV pk grad 7 mmHg    Aortic Valve Area by Continuity of VTI 3.66 cm2    LV A4C EF 58.4     Narrative                   [address], [address], [place] [address]          Tel [phone] Fax [phone]    TRANSTHORACIC ECHOCARDIOGRAM REPORT    Patient Name:       ANA MARIA BARROW WOOD      Reading Physician:    [id number] Theodore Cortés MD, [facility]  Study Date:         2/12/2025           Ordering Provider:    [id number] MY HUGO  MRN/PID:            [id number]            Fellow:  Accession#:         [id number]        Nurse:  Date of Birth/Age:  1975 / 49     Sonographer:          Katey torres                                     RDCS, RT(R),                                                                RDMS, RVT  Gender Assigned at  M                   Additional Staff:  Birth:  Height:             177.80 cm           Admit Date:  Weight:             102.51 kg           Admission Status:     Outpatient  BSA / BMI:          2.20 m2 / 32.43     Department Location:  [facility]                      kg/m2                                     [place]  Blood Pressure: 110 /80 mmHg    Study Type:    TRANSTHORACIC ECHO (TTE) COMPLETE  Diagnosis/ICD: Palpitations-R00.2  Indication:    Palpitations  CPT Codes:     Echo Complete w Full Doppler-07201    Patient History:  Pertinent  History: Dyspnea and Syncope.    Study Detail: The following Echo studies were performed: 2D, M-Mode, Doppler and                color flow.       PHYSICIAN INTERPRETATION:  Left Ventricle: Left ventricular ejection fraction is normal, by visual estimate at 65-70%. There are no regional wall motion abnormalities. The left ventricular cavity size is normal. There is mild increased septal and normal posterior left ventricular wall thickness. There is left ventricular concentric remodeling. Left ventricular diastolic filling was indeterminate.  Left Atrium: The left atrial size is normal.  Right Ventricle: The right ventricle is normal in size. There is normal right ventricular global systolic function.  Right Atrium: The right atrial size is normal.  Aortic Valve: The aortic valve is trileaflet. The aortic valve dimensionless index is 0.98. There is no evidence of aortic valve regurgitation. The peak instantaneous gradient of the aortic valve is 7 mmHg. The mean gradient of the aortic valve is 5 mmHg.  Mitral Valve: The mitral valve is normal in structure. The peak instantaneous gradient of the mitral valve is 4 mmHg. There is no evidence of mitral valve regurgitation.  Tricuspid Valve: The tricuspid valve is structurally normal. No evidence of tricuspid regurgitation.  Pulmonic Valve: The pulmonic valve is structurally normal. There is no indication of pulmonic valve regurgitation.  Pericardium: No pericardial effusion noted.  Aorta: The aortic root is normal.  Additional Comments: Normal Echocardiogram.       CONCLUSIONS:   1. Normal Echocardiogram.   2. Left ventricular ejection fraction is normal, by visual estimate at 65-70%.   3. Left ventricular diastolic filling was indeterminate.    QUANTITATIVE DATA SUMMARY:     2D MEASUREMENTS:             Normal Ranges:  Ao Root s:       3.40 cm  LAs:             3.60 cm     (2.7-4.0cm)  IVSd:            1.09 cm     (0.6-1.1cm)  LVPWd:           0.99 cm      (0.6-1.1cm)  LVIDd:           4.44 cm     (3.9-5.9cm)  LVIDs:           1.91 cm  LV Mass Index:   71.9 g/m2  LVEDV Index:     36.68 ml/m2  LV % FS          57.0 %       LEFT ATRIUM:                 Normal Ranges:  LA Vol A4C:       41.2 ml    (22+/-6mL/m2)  LA Vol A2C:       50.4 ml  LA Vol BP:        45.8 ml  LA Vol Index A4C: 18.8ml/m2  LA Vol Index A2C: 22.9 ml/m2  LA Vol Index BP:  20.8 ml/m2  LA Vol A4C:       39.2 ml  LA Vol A2C:       47.8 ml  LA Vol Index BSA: 19.8 ml/m2       LV SYSTOLIC FUNCTION:                       Normal Ranges:  EF-A4C View:    58 % (>=55%)  EF-A2C View:    51 %  EF-Biplane:     54 %  EF-Visual:      68 %  LV EF Reported: 68 %       LV DIASTOLIC FUNCTION:             Normal Ranges:  MV Peak E:             0.80 m/s    (0.7-1.2 m/s)  MV Peak A:             0.72 m/s    (0.42-0.7 m/s)  E/A Ratio:             1.12        (1.0-2.2)  MV e'                  0.113 m/s   (>8.0)  MV lateral e'          0.12 m/s  MV medial e'           0.10 m/s  E/e' Ratio:            7.14        (<8.0)  PulmV Sys Jewel:         63.66 cm/s  PulmV Gomez Jewel:        26.29 cm/s  PulmV S/D Jewel:         2.42  PulmV A Revs Jewel:      26.18 cm/s  PulmV A Revs Dur:      102.76 msec       MITRAL VALVE:          Normal Ranges:  MV Vmax:      0.94 m/s (<=1.3m/s)  MV peak PG:   3.5 mmHg (<5mmHg)  MV mean P.2 mmHg (<48mmHg)  MV VTI:       25.61 cm (10-13cm)  MV DT:        176 msec (150-240msec)  MV PHT:       45 msec  (30-60msec)  MVA by PHT:   4.89 cm2 (4-6cm2)       AORTIC VALVE:                     Normal Ranges:  AoV Vmax:                1.31 m/s (<=1.7m/s)  AoV Peak P.9 mmHg (<20mmHg)  AoV Mean P.8 mmHg (1.7-11.5mmHg)  LVOT Max Jewel:            1.22 m/s (<=1.1m/s)  AoV VTI:                 29.87 cm (18-25cm)  LVOT VTI:                29.22 cm  LVOT Diameter:           2.18 cm  (1.8-2.4cm)  AoV Area, VTI:           3.66 cm2 (2.5-5.5cm2)  AoV Area,Vmax:           3.49 cm2 (2.5-4.5cm2)  AoV  Dimensionless Index: 0.98       RIGHT VENTRICLE:  RV Basal 3.23 cm  RV Major 7.3 cm  TAPSE:   26.3 mm  RV s'    0.15 m/s       TRICUSPID VALVE/RVSP:          Normal Ranges:  Peak TR Velocity:     2.42 m/s  Est. RA Pressure:     5 mmHg  RV Syst Pressure:     28 mmHg  (< 30mmHg)  IVC Diam:             2.01 cm       PULMONIC VALVE:          Normal Ranges:  PV Accel Time:  72 msec  (>120ms)  PV Max Jewel:     0.8 m/s  (0.6-0.9m/s)  PV Max P.5 mmHg       PULMONARY VEINS:  PulmV A Revs Dur: 102.76 msec  PulmV A Revs Jewel: 26.18 cm/s  PulmV Gomez Jewel:   26.29 cm/s  PulmV S/D Jewel:    2.42  PulmV Sys Jewel:    63.66 cm/s       AORTA:  Asc Ao Diam 2.74 cm       [id number] Theodore Cortés [facility], [facility]  Electronically signed on 2025 at 8:25:36 AM         ** Final **         8:33 AM      Results were successfully communicated with the patient and they acknowledged their understanding.

## 2025-02-15 LAB
ALBUMIN SERPL-MCNC: 4.6 G/DL (ref 3.6–5.1)
ALP SERPL-CCNC: 97 U/L (ref 36–130)
ALT SERPL-CCNC: 23 U/L (ref 9–46)
ANION GAP SERPL CALCULATED.4IONS-SCNC: 9 MMOL/L (CALC) (ref 7–17)
AST SERPL-CCNC: 13 U/L (ref 10–40)
BILIRUB SERPL-MCNC: 0.4 MG/DL (ref 0.2–1.2)
BUN SERPL-MCNC: 14 MG/DL (ref 7–25)
CALCIUM SERPL-MCNC: 9.7 MG/DL (ref 8.6–10.3)
CHLORIDE SERPL-SCNC: 103 MMOL/L (ref 98–110)
CHOLEST SERPL-MCNC: 167 MG/DL
CHOLEST/HDLC SERPL: 3.8 (CALC)
CO2 SERPL-SCNC: 30 MMOL/L (ref 20–32)
CREAT SERPL-MCNC: 1.01 MG/DL (ref 0.6–1.29)
EGFRCR SERPLBLD CKD-EPI 2021: 92 ML/MIN/1.73M2
ERYTHROCYTE [DISTWIDTH] IN BLOOD BY AUTOMATED COUNT: 13 % (ref 11–15)
GLUCOSE SERPL-MCNC: 88 MG/DL (ref 65–99)
HCT VFR BLD AUTO: 48.5 % (ref 38.5–50)
HDLC SERPL-MCNC: 44 MG/DL
HGB BLD-MCNC: 15.7 G/DL (ref 13.2–17.1)
LDLC SERPL CALC-MCNC: 90 MG/DL (CALC)
MCH RBC QN AUTO: 28.2 PG (ref 27–33)
MCHC RBC AUTO-ENTMCNC: 32.4 G/DL (ref 32–36)
MCV RBC AUTO: 87.2 FL (ref 80–100)
NONHDLC SERPL-MCNC: 123 MG/DL (CALC)
PHENYTOIN FREE SERPL-MCNC: <0.5 MG/L (ref 1–2)
PHENYTOIN SERPL-MCNC: 4.5 MG/L (ref 10–20)
PLATELET # BLD AUTO: 227 THOUSAND/UL (ref 140–400)
PMV BLD REES-ECKER: 9.9 FL (ref 7.5–12.5)
POTASSIUM SERPL-SCNC: 4.7 MMOL/L (ref 3.5–5.3)
PROT SERPL-MCNC: 7 G/DL (ref 6.1–8.1)
PSA SERPL-MCNC: 0.41 NG/ML
RBC # BLD AUTO: 5.56 MILLION/UL (ref 4.2–5.8)
SODIUM SERPL-SCNC: 142 MMOL/L (ref 135–146)
TESTOST FREE SERPL-MCNC: 147 PG/ML (ref 35–155)
TESTOST SERPL-MCNC: 961 NG/DL (ref 250–1100)
TRIGL SERPL-MCNC: 240 MG/DL
TSH SERPL-ACNC: 1.96 MIU/L (ref 0.4–4.5)
WBC # BLD AUTO: 5.4 THOUSAND/UL (ref 3.8–10.8)

## 2025-02-24 PROCEDURE — 93272 ECG/REVIEW INTERPRET ONLY: CPT | Performed by: INTERNAL MEDICINE

## 2025-02-25 ENCOUNTER — TELEPHONE (OUTPATIENT)
Dept: CARDIOLOGY | Facility: CLINIC | Age: 50
End: 2025-02-25
Payer: COMMERCIAL

## 2025-02-25 NOTE — TELEPHONE ENCOUNTER
----- Message from Jazzmine Flores sent at 2/25/2025  3:00 PM EST -----  DINORA did not show any atrial fib.  For most part is NSR there was brief sinus tachycardiac but nothing to warrant additional changes.  ----- Message -----  From: Jania Caraballo MD  Sent: 2/24/2025   4:38 PM EST  To: GINGER Herron-CNP

## 2025-02-25 NOTE — TELEPHONE ENCOUNTER
Result Communication    Resulted Orders   Holter Or Event Cardiac Monitor    Narrative    This is a 30-day event monitor    Indication palpitation    Procedure patient underwent 30-day event monitor.  Baseline rhythm is   sinus ranging from 76 262 bpm patient transmitted multiple strips with   complaint of palpitation chest pain most of those episode associated with   sinus rhythm or mild sinus tachycardia rare PACs and PVCs were seen.    Conclusion    1.  Normal sinus rhythm  2.  Rare symptomatic PACs and PVCs  4.  Patient sent multiple transmission with complaint of chest pain and   palpitation seem to coincide mainly with mild sinus tachycardia on the   rare occasion with PACs and PVCs  5.  No significant tacky or bradycardia arrhythmia         3:10 PM      Results were successfully communicated with the patient and they acknowledged their understanding.

## 2025-03-04 ENCOUNTER — APPOINTMENT (OUTPATIENT)
Dept: CARDIOLOGY | Facility: CLINIC | Age: 50
End: 2025-03-04
Payer: COMMERCIAL

## 2025-03-04 VITALS
HEART RATE: 80 BPM | HEIGHT: 70 IN | BODY MASS INDEX: 33.07 KG/M2 | DIASTOLIC BLOOD PRESSURE: 84 MMHG | SYSTOLIC BLOOD PRESSURE: 110 MMHG | WEIGHT: 231 LBS

## 2025-03-04 DIAGNOSIS — R00.2 PALPITATIONS: ICD-10-CM

## 2025-03-04 PROCEDURE — 99213 OFFICE O/P EST LOW 20 MIN: CPT | Performed by: NURSE PRACTITIONER

## 2025-03-04 PROCEDURE — 1036F TOBACCO NON-USER: CPT | Performed by: NURSE PRACTITIONER

## 2025-03-04 PROCEDURE — 3008F BODY MASS INDEX DOCD: CPT | Performed by: NURSE PRACTITIONER

## 2025-03-04 RX ORDER — METOPROLOL SUCCINATE 25 MG/1
25 TABLET, EXTENDED RELEASE ORAL DAILY
Qty: 30 TABLET | Refills: 11 | Status: SHIPPED | OUTPATIENT
Start: 2025-03-04 | End: 2026-03-04

## 2025-03-04 ASSESSMENT — ENCOUNTER SYMPTOMS
DYSPNEA ON EXERTION: 0
PALPITATIONS: 1
IRREGULAR HEARTBEAT: 0
NEAR-SYNCOPE: 0
SYNCOPE: 0
PND: 0
ORTHOPNEA: 0

## 2025-03-04 NOTE — LETTER
"March 4, 2025     Nithin Olivera DO  [address] [facility] [address]    Patient: Crow Monae   YOB: 1975   Date of Visit: 3/4/2025       Dear Dr. Nithin Olivera DO:    Thank you for referring Crow Monae to me for evaluation. Below are my notes for this consultation.  If you have questions, please do not hesitate to call me. I look forward to following your patient along with you.       Sincerely,     Jazzmine Flores, APRN-CNP      CC: No Recipients  ______________________________________________________________________________________    Chief Complaint  \" Still feeling the same\"    Reason for Visit  Patient presents to the office today for outpatient follow-up for testing follow-up.    Last evaluated in clinic by myself January 2025.  At that time patient was seen as an add-on due to reported Apple Watch showing\" atrial fibrillation\".  I looked at the tracings and they were more than likely PACs.  Nonetheless subsequent testing:  GXT no ischemia at 10.1 METS  Unremarkable TTE with LVEF 65%  30-day Alvaro of Hearts normal sinus rhythm, episodes of sinus tachycardia no PSVT or atrial fibrillation.    Presents today ambulatory with steady gait.  Accompanied by patient  Patient denies any hospitalizations or significant changes to interval medical history since last office follow-up.     February 2025 reviewed including TSH, Chem-6 and lipid profile.    History of Present Illness   Patient is a pleasant 49-year-old gentleman who presents to the office today with continued concerns of palpitations.  He reports when he was driving yesterday he had a heart rate recorded at 120 on his Apple Watch.  He has cut back on coffee, has 1 yellow monster in the morning.  Recent labs are unremarkable.  He has resumed an exercise program.  Recently had 3-week influenza A infection and sinus infection.      He reports that elevated heart rates are happening more days than not, they are worrisome and causing " "anxiety.  He denies dizziness or lightheadedness, no additional syncopal episodes.  At this point, we will trial a short course of beta-blocker (no calcium channel blocker due to Dilantin) to see if there is any benefit due to continued symptoms that are impacting overall quality of life.    Patient reports that overall has no complaint(s) of chest pain, chest pressure/discomfort, claudication, dyspnea, exertional chest pressure/discomfort, fatigue, and lower extremity edema    Daily activity:    > 4 METs  Denies any change in exercise capacity or functional tolerance since last office visit.    The importance of primary prevention reviewed:  HTN: Optimal on no treatment  HLD: ACC/AHA 10 year risk 1.9% (based on Feb 2025 lipid profile) - no indication statin  DM: Denies  Smoker: Denies  BMI:  Reviewed the merits of healthy lifestyle choices on overall cardiovascular health.    Review of Systems   Cardiovascular:  Positive for palpitations. Negative for chest pain, dyspnea on exertion, irregular heartbeat, leg swelling, near-syncope, orthopnea, paroxysmal nocturnal dyspnea and syncope.        Visit Vitals  /84 (BP Location: Left arm, Patient Position: Sitting)   Pulse 80   Ht 1.778 m (5' 10\")   Wt 105 kg (231 lb)   BMI 33.15 kg/m²   Smoking Status Never   BSA 2.28 m²     Physical Exam  Vitals and nursing note reviewed.   Constitutional:       Appearance: Normal appearance.   Cardiovascular:      Rate and Rhythm: Normal rate and regular rhythm.      Heart sounds: Normal heart sounds.   Pulmonary:      Effort: Pulmonary effort is normal.      Breath sounds: Normal breath sounds.   Musculoskeletal:      Cervical back: Full passive range of motion without pain.      Right lower leg: No edema.      Left lower leg: No edema.   Skin:     General: Skin is cool.   Neurological:      Mental Status: He is alert and oriented to person, place, and time.   Psychiatric:         Attention and Perception: Attention normal.    "      Mood and Affect: Mood normal.         Behavior: Behavior is cooperative.      No Known Allergies    Current Outpatient Medications   Medication Instructions   • ketoconazole (NIZOral) 2 % shampoo    • metoprolol succinate XL (TOPROL-XL) 25 mg, oral, Daily, Do not crush or chew.   • mometasone (Elocon) 0.1 % cream 2 times daily   • phenytoin ER (Dilantin) 100 mg capsule TAKE 4 CAPSULES AT BEDTIME   • semaglutide 2 mg, subcutaneous, Once Weekly   • testosterone (ANDROGEL) 50 mg, Daily      Assessment:    BMI 33.0-33.9,adult  Likely inaccurate due to clothing  Remains on ozempic and has lost > 100 pounds    Cardiac and Vasculature  Jan 2025 GXT:  No ischemia at 10.1 METs    Feb 2025 TTE  EF 65-70%      Palpitations  Jan 2025 30 day KOH  NSR  Rare PAC/PVC  Sinus tachycardia  No PSVT  Triggered symptoms - sinus tachycardia    Continues to report symptomatic palpitations and elevated HR occurring most days of the week and causing worry/anxiety.  No associated dizziness  No recurrent syncope    Plan:     Through informed decision making process incorporating patients unique circumstances, the following treatment plan will be initiated:    1.  Prescription drug management of cardiovascular medication for efficacy, adherence to treatment, side effect assessment and polypharmacy. Current treatment clinically warranted and to continue with following modifications:    -  Begin toprol 25 mg daily for palpitations    2.  Return for follow-up; in the interim, contact the office if new symptoms arise.  NP 3 months     Jazzmine Flores MSN, APRN-CNP, PMHNP-BC  [facility] [place]  [place], [place]     Please excuse any errors in grammar or translation related to this dictation. Voice recognition software was utilized to prepare this document.

## 2025-03-04 NOTE — ASSESSMENT & PLAN NOTE
Jan 2025 30 day DINORA  NSR  Rare PAC/PVC  Sinus tachycardia  No PSVT  Triggered symptoms - sinus tachycardia    Continues to report symptomatic palpitations and elevated HR occurring most days of the week and causing worry/anxiety.  No associated dizziness  No recurrent syncope

## 2025-03-04 NOTE — PROGRESS NOTES
"Chief Complaint  \" Still feeling the same\"    Reason for Visit  Patient presents to the office today for outpatient follow-up for testing follow-up.    Last evaluated in clinic by myself January 2025.  At that time patient was seen as an add-on due to reported Apple Watch showing\" atrial fibrillation\".  I looked at the tracings and they were more than likely PACs.  Nonetheless subsequent testing:  GXT no ischemia at 10.1 METS  Unremarkable TTE with LVEF 65%  30-day Alvaro of Hearts normal sinus rhythm, episodes of sinus tachycardia no PSVT or atrial fibrillation.    Presents today ambulatory with steady gait.  Accompanied by patient  Patient denies any hospitalizations or significant changes to interval medical history since last office follow-up.     February 2025 reviewed including TSH, Chem-6 and lipid profile.    History of Present Illness   Patient is a pleasant 49-year-old gentleman who presents to the office today with continued concerns of palpitations.  He reports when he was driving yesterday he had a heart rate recorded at 120 on his Apple Watch.  He has cut back on coffee, has 1 yellow monster in the morning.  Recent labs are unremarkable.  He has resumed an exercise program.  Recently had 3-week influenza A infection and sinus infection.      He reports that elevated heart rates are happening more days than not, they are worrisome and causing anxiety.  He denies dizziness or lightheadedness, no additional syncopal episodes.  At this point, we will trial a short course of beta-blocker (no calcium channel blocker due to Dilantin) to see if there is any benefit due to continued symptoms that are impacting overall quality of life.    Patient reports that overall has no complaint(s) of chest pain, chest pressure/discomfort, claudication, dyspnea, exertional chest pressure/discomfort, fatigue, and lower extremity edema    Daily activity:    > 4 METs  Denies any change in exercise capacity or functional " "tolerance since last office visit.    The importance of primary prevention reviewed:  HTN: Optimal on no treatment  HLD: ACC/AHA 10 year risk 1.9% (based on Feb 2025 lipid profile) - no indication statin  DM: Denies  Smoker: Denies  BMI:  Reviewed the merits of healthy lifestyle choices on overall cardiovascular health.    Review of Systems   Cardiovascular:  Positive for palpitations. Negative for chest pain, dyspnea on exertion, irregular heartbeat, leg swelling, near-syncope, orthopnea, paroxysmal nocturnal dyspnea and syncope.        Visit Vitals  /84 (BP Location: Left arm, Patient Position: Sitting)   Pulse 80   Ht 1.778 m (5' 10\")   Wt 105 kg (231 lb)   BMI 33.15 kg/m²   Smoking Status Never   BSA 2.28 m²     Physical Exam  Vitals and nursing note reviewed.   Constitutional:       Appearance: Normal appearance.   Cardiovascular:      Rate and Rhythm: Normal rate and regular rhythm.      Heart sounds: Normal heart sounds.   Pulmonary:      Effort: Pulmonary effort is normal.      Breath sounds: Normal breath sounds.   Musculoskeletal:      Cervical back: Full passive range of motion without pain.      Right lower leg: No edema.      Left lower leg: No edema.   Skin:     General: Skin is cool.   Neurological:      Mental Status: He is alert and oriented to person, place, and time.   Psychiatric:         Attention and Perception: Attention normal.         Mood and Affect: Mood normal.         Behavior: Behavior is cooperative.      No Known Allergies    Current Outpatient Medications   Medication Instructions    ketoconazole (NIZOral) 2 % shampoo     metoprolol succinate XL (TOPROL-XL) 25 mg, oral, Daily, Do not crush or chew.    mometasone (Elocon) 0.1 % cream 2 times daily    phenytoin ER (Dilantin) 100 mg capsule TAKE 4 CAPSULES AT BEDTIME    semaglutide 2 mg, subcutaneous, Once Weekly    testosterone (ANDROGEL) 50 mg, Daily      Assessment:    BMI 33.0-33.9,adult  Likely inaccurate due to " clothing  Remains on ozempic and has lost > 100 pounds    Cardiac and Vasculature  Jan 2025 GXT:  No ischemia at 10.1 METs    Feb 2025 TTE  EF 65-70%      Palpitations  Jan 2025 30 day KOH  NSR  Rare PAC/PVC  Sinus tachycardia  No PSVT  Triggered symptoms - sinus tachycardia    Continues to report symptomatic palpitations and elevated HR occurring most days of the week and causing worry/anxiety.  No associated dizziness  No recurrent syncope    Plan:     Through informed decision making process incorporating patients unique circumstances, the following treatment plan will be initiated:    1.  Prescription drug management of cardiovascular medication for efficacy, adherence to treatment, side effect assessment and polypharmacy. Current treatment clinically warranted and to continue with following modifications:    -  Begin toprol 25 mg daily for palpitations    2.  Return for follow-up; in the interim, contact the office if new symptoms arise.  NP 3 months     Jazzmine Flores MSN, APRN-CNP, PMHNP-[facility] [place]  [place], [place]     Please excuse any errors in grammar or translation related to this dictation. Voice recognition software was utilized to prepare this document.

## 2025-03-04 NOTE — PATIENT INSTRUCTIONS
Please bring all medicines, vitamins, and herbal supplements with you when you come to the office.    Prescriptions will not be filled unless you are compliant with your follow up appointments or have a follow up appointment scheduled as per instruction of your physician. Refills should be requested at the time of your visit.     PLAN:   Through informed decision making process incorporating patients unique circumstances, the following treatment plan will be initiated:    1.  Prescription drug management of cardiovascular medication for efficacy, adherence to treatment, side effect assessment and polypharmacy. Current treatment clinically warranted and to continue with following modifications:    -  Begin toprol 25 mg daily for palpitations    2.  Return for follow-up; in the interim, contact the office if new symptoms arise.  NP 3 months

## 2025-04-01 ENCOUNTER — TELEMEDICINE (OUTPATIENT)
Dept: PRIMARY CARE | Facility: CLINIC | Age: 50
End: 2025-04-01
Payer: COMMERCIAL

## 2025-04-01 DIAGNOSIS — B35.3 TINEA PEDIS OF RIGHT FOOT: Primary | ICD-10-CM

## 2025-04-01 PROCEDURE — 99214 OFFICE O/P EST MOD 30 MIN: CPT | Performed by: NURSE PRACTITIONER

## 2025-04-01 RX ORDER — TERBINAFINE HYDROCHLORIDE 250 MG/1
250 TABLET ORAL DAILY
Qty: 14 TABLET | Refills: 0 | Status: SHIPPED | OUTPATIENT
Start: 2025-04-01 | End: 2025-04-15

## 2025-04-02 NOTE — PROGRESS NOTES
Subjective   Patient ID: Crow Monae is a 49 y.o. male who presents for Tinea Pedis. I performed this visit using real-time telehealth tools, including an audio/video connection between Jax Monae and myself, Елена Marley CNP, within the [facility].  Consent has been obtained for this visit. I have verbally confirmed with Jax Monae (or parent if under 18) that they are physically located in the [facility] during this virtual visit. Telemedicine appropriate evaluation completed.     HPI     Patient reports that he's had athlete's foot to his right foot for a few months. He has tried a couple different OTC creams (Lotrimin) which helps for a short time but does not get rid of it. He has even tried mometasone which he has at home for eczema as well as ketoconazole shampoo. The foot is very itchy, mostly at night and feels a little flaky. He is leaving for [place] in a week and hoping to get an oral antifungal.     Review of Systems  ROS negative except as noted above in HPI.     Objective   There were no vitals taken for this visit.      Chemistry    Lab Results   Component Value Date/Time     02/11/2025 0811    K 4.7 02/11/2025 0811     02/11/2025 0811    CO2 30 02/11/2025 0811    BUN 14 02/11/2025 0811    CREATININE 1.01 02/11/2025 0811    Lab Results   Component Value Date/Time    CALCIUM 9.7 02/11/2025 0811    ALKPHOS 97 02/11/2025 0811    AST 13 02/11/2025 0811    ALT 23 02/11/2025 0811    BILITOT 0.4 02/11/2025 0811         Physical Exam  A full physical exam was unable to be completed due to the limitations of the telehealth visit, but those observed are noted below.     Constitutional: Alert and in no acute distress  Pulmonary: No respiratory distress  Neurologic: Normal cortical function  Psychiatric: Normal judgment and insight. Normal mood and affect  Skin: See photos            Assessment/Plan   Diagnoses and all orders for this visit:  Tinea pedis of right foot  -     terbinafine  (LamISIL) 250 mg tablet; Take 1 tablet (250 mg) by mouth once daily for 14 days.  -      Reviewed LFTs from 2/11/2025  -      Advised no alcohol or tylenol while on terbinafine    FU w/ PCP if symptoms persist or worsen    GINGER Bernabe-[facility]

## 2025-04-22 ENCOUNTER — APPOINTMENT (OUTPATIENT)
Dept: ENDOCRINOLOGY | Facility: CLINIC | Age: 50
End: 2025-04-22
Payer: COMMERCIAL

## 2025-05-07 ENCOUNTER — OFFICE VISIT (OUTPATIENT)
Dept: PRIMARY CARE | Facility: CLINIC | Age: 50
End: 2025-05-07
Payer: COMMERCIAL

## 2025-05-07 VITALS
WEIGHT: 229 LBS | BODY MASS INDEX: 32.78 KG/M2 | HEART RATE: 69 BPM | RESPIRATION RATE: 14 BRPM | DIASTOLIC BLOOD PRESSURE: 88 MMHG | SYSTOLIC BLOOD PRESSURE: 138 MMHG | HEIGHT: 70 IN | OXYGEN SATURATION: 99 % | TEMPERATURE: 98.8 F

## 2025-05-07 DIAGNOSIS — R41.840 CONCENTRATION DEFICIT: ICD-10-CM

## 2025-05-07 DIAGNOSIS — Z00.00 PERIODIC HEALTH ASSESSMENT, GENERAL SCREENING, ADULT: Primary | ICD-10-CM

## 2025-05-07 DIAGNOSIS — T78.3XXS ANGIOEDEMA, SEQUELA: ICD-10-CM

## 2025-05-07 DIAGNOSIS — F41.9 ANXIETY: ICD-10-CM

## 2025-05-07 PROCEDURE — 1036F TOBACCO NON-USER: CPT | Performed by: INTERNAL MEDICINE

## 2025-05-07 PROCEDURE — 99214 OFFICE O/P EST MOD 30 MIN: CPT | Performed by: INTERNAL MEDICINE

## 2025-05-07 PROCEDURE — 3008F BODY MASS INDEX DOCD: CPT | Performed by: INTERNAL MEDICINE

## 2025-05-07 RX ORDER — BUPROPION HYDROCHLORIDE 150 MG/1
150 TABLET ORAL EVERY MORNING
Qty: 30 TABLET | Refills: 11 | Status: SHIPPED | OUTPATIENT
Start: 2025-05-07 | End: 2026-05-07

## 2025-05-07 RX ORDER — TIRZEPATIDE 7.5 MG/.5ML
7.5 INJECTION, SOLUTION SUBCUTANEOUS WEEKLY
Qty: 2 ML | Refills: 2 | Status: SHIPPED | OUTPATIENT
Start: 2025-05-07 | End: 2025-05-07

## 2025-05-07 RX ORDER — TIRZEPATIDE 7.5 MG/.5ML
7.5 INJECTION, SOLUTION SUBCUTANEOUS WEEKLY
Qty: 2 ML | Refills: 2 | Status: SHIPPED | OUTPATIENT
Start: 2025-05-07

## 2025-05-07 ASSESSMENT — ENCOUNTER SYMPTOMS
COUGH: 0
CONSTIPATION: 0
DIARRHEA: 0
PALPITATIONS: 0
NAUSEA: 0
SHORTNESS OF BREATH: 0
ABDOMINAL PAIN: 0
ROS SKIN COMMENTS: SWOLLEN LIPS
WHEEZING: 0

## 2025-05-07 NOTE — PROGRESS NOTES
"Subjective   Patient ID: Crow Monae is a 49 y.o. male who presents for lip swelling.      Took online medication sermolin for weight loss. Had angioedema so he stopped it.  However some weeks later the same thing happened.  He then became concerned it was possibly the Ozempic triggering this so he has stopped the Ozempic.  He has had no recurrence since.  Important note, the swelling was only of his lips.  No tongue swelling no funny sensation in his throat no wheezing and shortness of breath.    Review of Systems   Respiratory:  Negative for cough, shortness of breath and wheezing.    Cardiovascular:  Negative for chest pain and palpitations.   Gastrointestinal:  Negative for abdominal pain, constipation, diarrhea and nausea.   Skin:         Swollen lips       Objective   /88 (BP Location: Left arm, Patient Position: Sitting, BP Cuff Size: Adult)   Pulse 69   Temp 37.1 °C (98.8 °F) (Tympanic)   Resp 14   Ht 1.778 m (5' 10\")   Wt 104 kg (229 lb)   SpO2 99%   BMI 32.86 kg/m²     Physical Exam  Vitals reviewed.   Constitutional:       Appearance: Normal appearance.   HENT:      Head: Normocephalic.   Eyes:      Pupils: Pupils are equal, round, and reactive to light.   Cardiovascular:      Rate and Rhythm: Normal rate and regular rhythm.   Pulmonary:      Effort: Pulmonary effort is normal.      Breath sounds: Normal breath sounds.   Musculoskeletal:         General: Normal range of motion.   Neurological:      General: No focal deficit present.      Mental Status: He is alert.   Psychiatric:         Mood and Affect: Mood normal.         Assessment/Plan   Problem List Items Addressed This Visit    None  Visit Diagnoses         Codes      Periodic health assessment, general screening, adult    -  Primary Z00.00    Relevant Medications    tirzepatide (Mounjaro) 7.5 mg/0.5 mL pen injector      Angioedema, sequela     T78.3XXS    Relevant Orders    Referral to Allergy      Anxiety     F41.9    Relevant " Medications    buPROPion XL (Wellbutrin XL) 150 mg 24 hr tablet      Concentration deficit     R41.840        Reviewed and discussed all the above.  He still wants to try something for weight loss.  We discussed the concern for a possible cross-reactivity between Ozempic and Mounjaro; ever, they are distinctly different medicines at the same time.  Will proceed to use Mounjaro cautiously.  We did discuss keeping Benadryl around and if any swelling should recur he should go to the emergency room immediately.  Will also refer to allergy immunology for further evaluation into his lip swelling.  He certainly should not take the online medication anymore.  He also concerned about ADD and concentration deficits.  He does admit to some stress and anxiety.  We did discuss the side effects of most ADD medicines.  However, he is agreeable to trying Wellbutrin.  Will start this at the low dose and titrate up if need be.  We did review and discuss his other ongoing medical issues including his most recent blood test.  No other changes are needed the present time.  Will follow-up in a few months after the Mounjaro to see how things are going.  He should return sooner if any issues or concerns

## 2025-06-02 RX ORDER — TIRZEPATIDE 10 MG/.5ML
10 INJECTION, SOLUTION SUBCUTANEOUS WEEKLY
Qty: 2 ML | Refills: 3 | Status: SHIPPED | OUTPATIENT
Start: 2025-06-02

## 2025-06-09 ENCOUNTER — APPOINTMENT (OUTPATIENT)
Dept: CARDIOLOGY | Facility: CLINIC | Age: 50
End: 2025-06-09
Payer: COMMERCIAL

## 2025-06-13 ENCOUNTER — APPOINTMENT (OUTPATIENT)
Dept: CARDIOLOGY | Facility: CLINIC | Age: 50
End: 2025-06-13
Payer: COMMERCIAL

## 2025-07-17 ENCOUNTER — APPOINTMENT (OUTPATIENT)
Dept: ALLERGY | Facility: CLINIC | Age: 50
End: 2025-07-17
Payer: COMMERCIAL

## 2025-07-17 ENCOUNTER — APPOINTMENT (OUTPATIENT)
Dept: PRIMARY CARE | Facility: CLINIC | Age: 50
End: 2025-07-17
Payer: COMMERCIAL

## 2025-07-17 VITALS
OXYGEN SATURATION: 98 % | HEART RATE: 80 BPM | HEIGHT: 70 IN | WEIGHT: 206 LBS | RESPIRATION RATE: 14 BRPM | SYSTOLIC BLOOD PRESSURE: 120 MMHG | DIASTOLIC BLOOD PRESSURE: 80 MMHG | BODY MASS INDEX: 29.49 KG/M2 | TEMPERATURE: 97.6 F

## 2025-07-17 DIAGNOSIS — M54.41 CHRONIC RIGHT-SIDED LOW BACK PAIN WITH RIGHT-SIDED SCIATICA: ICD-10-CM

## 2025-07-17 DIAGNOSIS — Z00.00 PERIODIC HEALTH ASSESSMENT, GENERAL SCREENING, ADULT: Primary | ICD-10-CM

## 2025-07-17 DIAGNOSIS — G89.29 CHRONIC RIGHT-SIDED LOW BACK PAIN WITH RIGHT-SIDED SCIATICA: ICD-10-CM

## 2025-07-17 PROCEDURE — 99396 PREV VISIT EST AGE 40-64: CPT | Performed by: INTERNAL MEDICINE

## 2025-07-17 PROCEDURE — 1036F TOBACCO NON-USER: CPT | Performed by: INTERNAL MEDICINE

## 2025-07-17 PROCEDURE — 3008F BODY MASS INDEX DOCD: CPT | Performed by: INTERNAL MEDICINE

## 2025-07-17 RX ORDER — PREDNISONE 10 MG/1
TABLET ORAL
Qty: 30 TABLET | Refills: 0 | Status: SHIPPED | OUTPATIENT
Start: 2025-07-17 | End: 2025-07-27

## 2025-07-17 RX ORDER — TIRZEPATIDE 10 MG/.5ML
10 INJECTION, SOLUTION SUBCUTANEOUS WEEKLY
Qty: 6 ML | Refills: 0 | Status: SHIPPED | OUTPATIENT
Start: 2025-07-17

## 2025-07-17 ASSESSMENT — ENCOUNTER SYMPTOMS
PALPITATIONS: 0
NAUSEA: 0
CONSTIPATION: 0
WHEEZING: 0
ABDOMINAL PAIN: 0
DIARRHEA: 0
SHORTNESS OF BREATH: 0
COUGH: 0

## 2025-07-17 NOTE — PROGRESS NOTES
"Subjective   Patient ID: Crow Monae is a 49 y.o. male who presents for Annual Exam.    Overall doing well.  Patient is fairly active.  Denies any issues with CP,SOB or dizzy spells.  No issues with anxiety, depression or sleep related problems. Denies any issues with HA, numbness or tingling.  No issues or changes with bowel or bladder habits.    ROS is otherwise unremarkable.       Review of Systems   Respiratory:  Negative for cough, shortness of breath and wheezing.    Cardiovascular:  Negative for chest pain and palpitations.   Gastrointestinal:  Negative for abdominal pain, constipation, diarrhea and nausea.       Objective   /80 (BP Location: Left arm, Patient Position: Sitting, BP Cuff Size: Adult)   Pulse 80   Temp 36.4 °C (97.6 °F) (Tympanic)   Resp 14   Ht 1.778 m (5' 10\")   Wt 93.4 kg (206 lb)   SpO2 98%   BMI 29.56 kg/m²     Physical Exam  Vitals reviewed.   Constitutional:       Appearance: Normal appearance.   HENT:      Head: Normocephalic.     Eyes:      Pupils: Pupils are equal, round, and reactive to light.       Cardiovascular:      Rate and Rhythm: Normal rate and regular rhythm.   Pulmonary:      Effort: Pulmonary effort is normal.      Breath sounds: Normal breath sounds.     Musculoskeletal:         General: Normal range of motion.     Neurological:      General: No focal deficit present.      Mental Status: He is alert.     Psychiatric:         Mood and Affect: Mood normal.         Assessment/Plan   Problem List Items Addressed This Visit    None  Visit Diagnoses         Codes      Periodic health assessment, general screening, adult    -  Primary Z00.00      Chronic right-sided low back pain with right-sided sciatica     M54.41, G89.29    Relevant Medications    predniSONE (Deltasone) 10 mg tablet    Other Relevant Orders    Referral to Physical Therapy      BMI 37.0-37.9, adult     Z68.37    Relevant Medications    tirzepatide (Mounjaro) 10 mg/0.5 mL pen injector      "   Physical exam is unremarkable.  We reviewed and discussed all the above.  We discussed current medications as well as most recent test results.  We discussed the importance and benefits of a healthy diet that is both low in sugars and low in saturated fats.  We reviewed and discussed the benefits of regular physical exercise especially when at or above a level of 150 minutes/week.  We also discussed the importance of stress management and good sleep hygiene.  We will continue to work on lifestyle improvements and follow-up in 6 months, sooner if any issues should arise.

## 2025-08-07 ENCOUNTER — APPOINTMENT (OUTPATIENT)
Dept: PRIMARY CARE | Facility: CLINIC | Age: 50
End: 2025-08-07
Payer: COMMERCIAL

## 2025-08-13 ENCOUNTER — APPOINTMENT (OUTPATIENT)
Dept: CARDIOLOGY | Facility: CLINIC | Age: 50
End: 2025-08-13
Payer: COMMERCIAL

## 2025-08-22 ENCOUNTER — OFFICE VISIT (OUTPATIENT)
Dept: ORTHOPEDIC SURGERY | Facility: CLINIC | Age: 50
End: 2025-08-22
Payer: COMMERCIAL

## 2025-08-22 ENCOUNTER — HOSPITAL ENCOUNTER (OUTPATIENT)
Dept: RADIOLOGY | Facility: EXTERNAL LOCATION | Age: 50
Discharge: HOME | End: 2025-08-22

## 2025-08-22 ENCOUNTER — HOSPITAL ENCOUNTER (OUTPATIENT)
Dept: RADIOLOGY | Facility: CLINIC | Age: 50
Discharge: HOME | End: 2025-08-22
Payer: COMMERCIAL

## 2025-08-22 DIAGNOSIS — M17.11 PRIMARY OSTEOARTHRITIS OF RIGHT KNEE: ICD-10-CM

## 2025-08-22 DIAGNOSIS — M25.561 RIGHT KNEE PAIN, UNSPECIFIED CHRONICITY: Primary | ICD-10-CM

## 2025-08-22 DIAGNOSIS — M25.561 RIGHT KNEE PAIN, UNSPECIFIED CHRONICITY: ICD-10-CM

## 2025-08-22 DIAGNOSIS — S76.311A HAMSTRING STRAIN, RIGHT, INITIAL ENCOUNTER: ICD-10-CM

## 2025-08-22 DIAGNOSIS — S83.241A TEAR OF MEDIAL MENISCUS OF RIGHT KNEE, CURRENT, UNSPECIFIED TEAR TYPE, INITIAL ENCOUNTER: ICD-10-CM

## 2025-08-22 PROCEDURE — 99212 OFFICE O/P EST SF 10 MIN: CPT | Mod: 25 | Performed by: FAMILY MEDICINE

## 2025-08-22 PROCEDURE — 2500000004 HC RX 250 GENERAL PHARMACY W/ HCPCS (ALT 636 FOR OP/ED): Performed by: FAMILY MEDICINE

## 2025-08-22 PROCEDURE — 20611 DRAIN/INJ JOINT/BURSA W/US: CPT | Mod: RT | Performed by: FAMILY MEDICINE

## 2025-08-22 PROCEDURE — 73562 X-RAY EXAM OF KNEE 3: CPT | Mod: RT

## 2025-08-22 RX ORDER — LIDOCAINE HYDROCHLORIDE 10 MG/ML
6 INJECTION, SOLUTION INFILTRATION; PERINEURAL
Status: COMPLETED | OUTPATIENT
Start: 2025-08-22 | End: 2025-08-22

## 2025-08-22 RX ORDER — TRIAMCINOLONE ACETONIDE 40 MG/ML
40 INJECTION, SUSPENSION INTRA-ARTICULAR; INTRAMUSCULAR
Status: COMPLETED | OUTPATIENT
Start: 2025-08-22 | End: 2025-08-22

## 2025-08-22 RX ADMIN — TRIAMCINOLONE ACETONIDE 40 MG: 400 INJECTION, SUSPENSION INTRA-ARTICULAR; INTRAMUSCULAR at 14:32

## 2025-08-22 RX ADMIN — LIDOCAINE HYDROCHLORIDE 6 ML: 10 INJECTION, SOLUTION INFILTRATION; PERINEURAL at 14:32

## 2025-08-22 ASSESSMENT — PATIENT HEALTH QUESTIONNAIRE - PHQ9
1. LITTLE INTEREST OR PLEASURE IN DOING THINGS: NOT AT ALL
SUM OF ALL RESPONSES TO PHQ9 QUESTIONS 1 AND 2: 0
2. FEELING DOWN, DEPRESSED OR HOPELESS: NOT AT ALL

## 2025-09-03 ENCOUNTER — APPOINTMENT (OUTPATIENT)
Dept: CARDIOLOGY | Facility: CLINIC | Age: 50
End: 2025-09-03
Payer: COMMERCIAL

## 2025-09-04 ASSESSMENT — ENCOUNTER SYMPTOMS
SYNCOPE: 0
PALPITATIONS: 0
ORTHOPNEA: 0
PND: 0
IRREGULAR HEARTBEAT: 0
NEAR-SYNCOPE: 0
DYSPNEA ON EXERTION: 0

## 2025-09-30 ENCOUNTER — APPOINTMENT (OUTPATIENT)
Dept: RADIOLOGY | Facility: HOSPITAL | Age: 50
End: 2025-09-30
Payer: COMMERCIAL

## 2026-09-08 ENCOUNTER — APPOINTMENT (OUTPATIENT)
Dept: CARDIOLOGY | Facility: CLINIC | Age: 51
End: 2026-09-08
Payer: COMMERCIAL